# Patient Record
Sex: FEMALE | Race: WHITE | NOT HISPANIC OR LATINO | Employment: FULL TIME | URBAN - METROPOLITAN AREA
[De-identification: names, ages, dates, MRNs, and addresses within clinical notes are randomized per-mention and may not be internally consistent; named-entity substitution may affect disease eponyms.]

---

## 2017-01-25 ENCOUNTER — HOSPITAL ENCOUNTER (OUTPATIENT)
Dept: RADIOLOGY | Facility: HOSPITAL | Age: 57
Discharge: HOME | End: 2017-01-25
Payer: COMMERCIAL

## 2017-01-25 ENCOUNTER — ALLSCRIPTS OFFICE VISIT (OUTPATIENT)
Dept: OTHER | Facility: OTHER | Age: 57
End: 2017-01-25

## 2017-01-25 ENCOUNTER — TRANSCRIBE ORDERS (OUTPATIENT)
Dept: RADIOLOGY | Facility: HOSPITAL | Age: 57
End: 2017-01-25

## 2017-01-25 DIAGNOSIS — M20.61 ACQUIRED DEFORMITY OF RIGHT TOE: Primary | ICD-10-CM

## 2017-01-25 DIAGNOSIS — G57.61 LESION OF RIGHT PLANTAR NERVE: ICD-10-CM

## 2017-01-25 PROCEDURE — 73630 X-RAY EXAM OF FOOT: CPT

## 2017-03-29 ENCOUNTER — ALLSCRIPTS OFFICE VISIT (OUTPATIENT)
Dept: OTHER | Facility: OTHER | Age: 57
End: 2017-03-29

## 2017-03-29 DIAGNOSIS — Z13.820 ENCOUNTER FOR SCREENING FOR OSTEOPOROSIS: ICD-10-CM

## 2017-11-06 ENCOUNTER — TRANSCRIBE ORDERS (OUTPATIENT)
Dept: ADMINISTRATIVE | Facility: HOSPITAL | Age: 57
End: 2017-11-06

## 2017-11-06 ENCOUNTER — APPOINTMENT (OUTPATIENT)
Dept: LAB | Facility: HOSPITAL | Age: 57
End: 2017-11-06
Payer: COMMERCIAL

## 2017-11-06 DIAGNOSIS — E55.9 VITAMIN D DEFICIENCY DISEASE: ICD-10-CM

## 2017-11-06 DIAGNOSIS — M34.1 CRST SYNDROME (HCC): Primary | ICD-10-CM

## 2017-11-06 DIAGNOSIS — Z79.899 ENCOUNTER FOR LONG-TERM (CURRENT) USE OF MEDICATIONS: ICD-10-CM

## 2017-11-06 DIAGNOSIS — M34.1 CRST SYNDROME (HCC): ICD-10-CM

## 2017-11-06 LAB
25(OH)D3 SERPL-MCNC: 37.5 NG/ML (ref 30–100)
ALBUMIN SERPL BCP-MCNC: 3.8 G/DL (ref 3.5–5)
ALP SERPL-CCNC: 82 U/L (ref 46–116)
ALT SERPL W P-5'-P-CCNC: 26 U/L (ref 12–78)
ANION GAP SERPL CALCULATED.3IONS-SCNC: 8 MMOL/L (ref 4–13)
AST SERPL W P-5'-P-CCNC: 20 U/L (ref 5–45)
BASOPHILS # BLD AUTO: 0.07 THOUSANDS/ΜL (ref 0–0.1)
BASOPHILS NFR BLD AUTO: 1 % (ref 0–1)
BILIRUB SERPL-MCNC: 0.4 MG/DL (ref 0.2–1)
BUN SERPL-MCNC: 18 MG/DL (ref 5–25)
CALCIUM SERPL-MCNC: 9.1 MG/DL (ref 8.3–10.1)
CHLORIDE SERPL-SCNC: 105 MMOL/L (ref 100–108)
CO2 SERPL-SCNC: 28 MMOL/L (ref 21–32)
CREAT SERPL-MCNC: 0.8 MG/DL (ref 0.6–1.3)
CRP SERPL QL: <3 MG/L
EOSINOPHIL # BLD AUTO: 0.16 THOUSAND/ΜL (ref 0–0.61)
EOSINOPHIL NFR BLD AUTO: 3 % (ref 0–6)
ERYTHROCYTE [DISTWIDTH] IN BLOOD BY AUTOMATED COUNT: 12.2 % (ref 11.6–15.1)
ERYTHROCYTE [SEDIMENTATION RATE] IN BLOOD: 6 MM/HOUR (ref 0–20)
GFR SERPL CREATININE-BSD FRML MDRD: 83 ML/MIN/1.73SQ M
GLUCOSE P FAST SERPL-MCNC: 90 MG/DL (ref 65–99)
HCT VFR BLD AUTO: 40 % (ref 34.8–46.1)
HGB BLD-MCNC: 13 G/DL (ref 11.5–15.4)
LYMPHOCYTES # BLD AUTO: 2.19 THOUSANDS/ΜL (ref 0.6–4.47)
LYMPHOCYTES NFR BLD AUTO: 39 % (ref 14–44)
MCH RBC QN AUTO: 30.4 PG (ref 26.8–34.3)
MCHC RBC AUTO-ENTMCNC: 32.5 G/DL (ref 31.4–37.4)
MCV RBC AUTO: 94 FL (ref 82–98)
MONOCYTES # BLD AUTO: 0.51 THOUSAND/ΜL (ref 0.17–1.22)
MONOCYTES NFR BLD AUTO: 9 % (ref 4–12)
NEUTROPHILS # BLD AUTO: 2.72 THOUSANDS/ΜL (ref 1.85–7.62)
NEUTS SEG NFR BLD AUTO: 48 % (ref 43–75)
NRBC BLD AUTO-RTO: 0 /100 WBCS
PLATELET # BLD AUTO: 301 THOUSANDS/UL (ref 149–390)
PMV BLD AUTO: 10.3 FL (ref 8.9–12.7)
POTASSIUM SERPL-SCNC: 4.8 MMOL/L (ref 3.5–5.3)
PROT SERPL-MCNC: 7.3 G/DL (ref 6.4–8.2)
RBC # BLD AUTO: 4.27 MILLION/UL (ref 3.81–5.12)
SODIUM SERPL-SCNC: 141 MMOL/L (ref 136–145)
WBC # BLD AUTO: 5.66 THOUSAND/UL (ref 4.31–10.16)

## 2017-11-06 PROCEDURE — 85652 RBC SED RATE AUTOMATED: CPT

## 2017-11-06 PROCEDURE — 82306 VITAMIN D 25 HYDROXY: CPT

## 2017-11-06 PROCEDURE — 85025 COMPLETE CBC W/AUTO DIFF WBC: CPT

## 2017-11-06 PROCEDURE — 80053 COMPREHEN METABOLIC PANEL: CPT

## 2017-11-06 PROCEDURE — 86140 C-REACTIVE PROTEIN: CPT

## 2017-11-06 PROCEDURE — 36415 COLL VENOUS BLD VENIPUNCTURE: CPT

## 2017-11-14 ENCOUNTER — GENERIC CONVERSION - ENCOUNTER (OUTPATIENT)
Dept: OTHER | Facility: OTHER | Age: 57
End: 2017-11-14

## 2017-12-05 ENCOUNTER — HOSPITAL ENCOUNTER (OUTPATIENT)
Dept: MAMMOGRAPHY | Facility: CLINIC | Age: 57
Discharge: HOME | End: 2017-12-05
Payer: COMMERCIAL

## 2017-12-05 DIAGNOSIS — Z13.820 ENCOUNTER FOR SCREENING FOR OSTEOPOROSIS: ICD-10-CM

## 2017-12-05 PROCEDURE — 77080 DXA BONE DENSITY AXIAL: CPT

## 2017-12-07 ENCOUNTER — GENERIC CONVERSION - ENCOUNTER (OUTPATIENT)
Dept: OTHER | Facility: OTHER | Age: 57
End: 2017-12-07

## 2018-01-11 NOTE — RESULT NOTES
Verified Results  US THYROID [id number] 08:39AM Linda Adams Order Number: [id number]    - Patient Instructions: To schedule this appointment, please contact Central Scheduling at [phone]  Test Name Result Flag Reference   US THYROID (Report)     THYROID ULTRASOUND     INDICATION: Thyroid nodule follow-up     COMPARISON: 1/4/2016 and priors     TECHNIQUE:  Ultrasound of the thyroid was performed with a high frequency linear transducer in transverse and sagittal planes including volumetric imaging sweeps as well as traditional still imaging technique  FINDINGS:   Normal homogeneous smooth echotexture  Right gland: 5 6 x 2 x 2 7 cm  Large solid hypervascular nodule measures 3 4 x 2 1 x 2 5 cm  Prior measurement 3 2 x 2 3 x 3 1 cm  This appears similar given variation in measuring technique  Small posterior hypoechoic nodule measures 6 x 4 x 6 mm, stable in retrospect  Left gland: 5 x 1 1 x 1 6 cm  Small colloid cysts again noted  Isthmus: The isthmus is 0 2 cm in AP dimension  IMPRESSION:   Large right thyroid nodule without significant change  No new nodules         Workstation performed: [id number]     Signed by:   Felisha Barrientos MD   12/28/16

## 2018-01-11 NOTE — PROGRESS NOTES
Assessment    1  Thyroid nodule (241 0) (E04 1)    Plan  Thyroid nodule    · Follow-up visit in 1 year Evaluation and Treatment  Follow-up  Status: Hold For -  Scheduling  Requested for: 20Jan2016   Ordered; For: Thyroid nodule; Ordered By: Prince Vargas Performed:  Due: [id number]    Discussion/Summary  Discussion Summary:   48 year old female with right thyroid nodule that appears stable  Ultrasound 1/4/16 showed nodule is the same size  She has no symptoms and clinically there is no change in size  I told her we would follow this clinically for now and re check one year  Call sooner if any issues  Medication SE Review and Pt Understands Tx: The treatment plan was reviewed with the patient/guardian  The patient/guardian understands and agrees with the treatment plan      Chief Complaint  Chief Complaint Free Text Note Form: 6 month thyroid f/u  History of Present Illness  HPI: 48year old female with right thyroid nodule for some time  No new complaints or symptoms related to it  Recent ultrasound done  Review of Systems  Complete-Female:   Constitutional: No fever, no chills, feels well, no tiredness, no recent weight gain or weight loss  Eyes: No complaints of eye pain, no red eyes, no eyesight problems, no discharge, no dry eyes, no itching of eyes  ENT: no complaints of earache, no loss of hearing, no nose bleeds, no nasal discharge, no sore throat, no hoarseness  Cardiovascular: No complaints of slow heart rate, no fast heart rate, no chest pain, no palpitations, no leg claudication, no lower extremity edema  Respiratory: No complaints of shortness of breath, no wheezing, no cough, no SOB on exertion, no orthopnea, no PND  Gastrointestinal: No complaints of abdominal pain, no constipation, no nausea or vomiting, no diarrhea, no bloody stools  Genitourinary: No complaints of dysuria, no incontinence, no pelvic pain, no dysmenorrhea, no vaginal discharge or bleeding  Musculoskeletal: hand swelling  Neurological: No complaints of headache, no confusion, no convulsions, no numbness, no dizziness or fainting, no tingling, no limb weakness, no difficulty walking  Psychiatric: Not suicidal, no sleep disturbance, no anxiety or depression, no change in personality, no emotional problems  Endocrine: No complaints of proptosis, no hot flashes, no muscle weakness, no deepening of the voice, no feelings of weakness  Hematologic/Lymphatic: No complaints of swollen glands, no swollen glands in the neck, does not bleed easily, does not bruise easily  Active Problems    1  Carpal tunnel syndrome, unspecified laterality (354 0) (G56 00)   2  Raynaud's phenomenon (443 0) (I73 00)   3  Scleroderma (710 1) (M34 9)   4  Screen for colon cancer (V76 51) (Z12 11)   5  Thyroid nodule (241 0) (E04 1)    Past Medical History    1  History of Patient denies medical problems (V49 89) (Z78 9)  Active Problems And Past Medical History Reviewed: The active problems and past medical history were reviewed and updated today  Surgical History    1  History of Oral Surgery Tooth Extraction  Surgical History Reviewed: The surgical history was reviewed and updated today  Family History    1  Family history of Skin cancer    2  Family history of Kidney disease   3  Family history of Lung cancer    4  Family history of Uterine cancer    5  Family history of Breast CA  Family History Reviewed: The family history was reviewed and updated today  Social History    · Always uses seat belt   · Full-time employment   ·    · Never A Smoker   · No drug use   · Occasional alcohol use  Social History Reviewed: The social history was reviewed and updated today  Current Meds   1  FLUoxetine HCl - 20 MG Oral Capsule; TAKE 1 CAPSULE Daily Recorded   2  NIFEdipine ER Osmotic Release 30 MG Oral Tablet Extended Release 24 Hour; TAKE 1   TABLET EVERY DAY Recorded   3   Plaquenil TABS; Therapy: (Recorded:31Jan2014) to Recorded    Allergies    1  Codeine Derivatives   2  Tetracyclines    Vitals  Vital Signs [Data Includes: Current Encounter]    Recorded: 25SNW4294 10:51AM   Temperature 98 F   Heart Rate 60   Respiration 12   Systolic 184   Diastolic 58   Height 5 ft 2 in   Weight 116 lb    BMI Calculated 21 22   BSA Calculated 1 52     Physical Exam    Constitutional   General appearance: No acute distress, well appearing and well nourished  Eyes   Conjunctiva and lids: No swelling, erythema or discharge  Pupils and irises: Equal, round and reactive to light  Sclera non-icteric  Ears, Nose, Mouth, and Throat   External inspection of ears and nose: Normal     Neck Abnormal  right thyroid nodule about 2 cm, no other thyroid masses, no nodes  Pulmonary   Respiratory effort: No increased work of breathing or signs of respiratory distress  Auscultation of lungs: Clear to auscultation, equal breath sounds bilaterally, no wheezes, no rales, no rhonci  Cardiovascular   Auscultation of heart: Normal rate and rhythm, normal S1 and S2, without murmurs  Examination of extremities for edema and/or varicosities: Normal     Carotid pulses: Normal     Abdomen   Abdomen: Non-tender, no masses  Liver and spleen: No hepatomegaly or splenomegaly  Lymphatic   Palpation of lymph nodes in neck: No lymphadenopathy  Musculoskeletal   Extremities: No clubbing, no cyanosis, no edema   Neurologic   Sensation: Motor and sensory grossly intact  Psychiatric   Orientation to person, place, and time: Normal     Mood and affect: Normal        Future Appointments    Date/Time Provider Specialty Site   03/16/2016 06:00 PM ANNE Lees   [address] [facility]     Signatures   Electronically signed by : Yaquelin Salvador MD; Jan 20 2016 11:14AM EST                       (Author)

## 2018-01-12 VITALS
TEMPERATURE: 97.9 F | SYSTOLIC BLOOD PRESSURE: 108 MMHG | HEIGHT: 62 IN | DIASTOLIC BLOOD PRESSURE: 62 MMHG | HEART RATE: 64 BPM | BODY MASS INDEX: 20.68 KG/M2 | RESPIRATION RATE: 12 BRPM | WEIGHT: 112.38 LBS

## 2018-01-15 NOTE — RESULT NOTES
Verified Results  (1) TSH WITH FT4 REFLEX [id number] 08:46AM Tiffanie HAYES Order Number: [id number]  TW Order Number: [id number] Order Number: [id number] Order Number: [id number]     Test Name Result Flag Reference   TSH 1 630 uIU/mL  0 358-3 740   The recommended reference ranges for TSH during pregnancy are as follows:  First trimester 0 1 to 2 5 uIU/mL  Second trimester  0 2 to 3 0 uIU/mL  Third trimester 0 3 to 3 0 uIU/m     (1) LIPID PANEL, FASTING [id number] 08:46AM Cayetano Gonzales Order Number: [id number]  TW Order Number: [id number] Order Number: [id number] Order Number: [id number]     Test Name Result Flag Reference   CHOLESTEROL 188 mg/dL     HDL,DIRECT 75 mg/dL H 40-60   Specimen collection should occur prior to Metamizole administration due to the potential for falsely depressed results  LDL CHOLESTEROL CALCULATED 105 mg/dL H 0-100   Triglyceride:         Normal              <150 mg/dl       Borderline High    150-199 mg/dl       High               200-499 mg/dl       Very High          >499 mg/dl  Cholesterol:         Desirable        <200 mg/dl      Borderline High  200-239 mg/dl      High             >239 mg/dl  HDL Cholesterol:        High    >59 mg/dL      Low     <41 mg/dL  LDL CALCULATED:    This screening LDL is a calculated result  It does not have the accuracy of the Direct Measured LDL in the monitoring of patients with hyperlipidemia and/or statin therapy  Direct Measure LDL (WSG418) must be ordered separately in these patients  TRIGLYCERIDES 41 mg/dL  <=150   Specimen collection should occur prior to N-Acetylcysteine or Metamizole administration due to the potential for falsely depressed results       (1) COMPREHENSIVE METABOLIC PANEL [id number] 55:99BL Tiffanie Aranda   TW Order Number: [id number]  TW Order Number: [id number] Order Number: [id number] Order Number: [id number]     Test Name Result Flag Reference   GLUCOSE,RANDM 81 mg/dL     If the patient is fasting, the ADA then defines impaired fasting glucose as > 100 mg/dL and diabetes as > or equal to 123 mg/dL  SODIUM 139 mmol/L  136-145   POTASSIUM 4 4 mmol/L  3 5-5 3   CHLORIDE 103 mmol/L  100-108   CARBON DIOXIDE 30 mmol/L  21-32   ANION GAP (CALC) 6 mmol/L  4-13   BLOOD UREA NITROGEN 12 mg/dL  5-25   CREATININE 0 68 mg/dL  0 60-1 30   Standardized to IDMS reference method   CALCIUM 8 7 mg/dL  8 3-10 1   BILI, TOTAL 0 21 mg/dL  0 20-1 00   ALK PHOSPHATAS 65 U/L     ALT (SGPT) 27 U/L  12-78   AST(SGOT) 22 U/L  5-45   ALBUMIN 3 7 g/dL  3 5-5 0   TOTAL PROTEIN 6 9 g/dL  6 4-8 2   eGFR Non-African American      >60 0 ml/min/1 73sq m   [facility] Disease Education Program recommendations are as follows:  GFR calculation is accurate only with a steady state creatinine  Chronic Kidney disease less than 60 ml/min/1 73 sq  meters  Kidney failure less than 15 ml/min/1 73 sq  meters  (1) CBC/PLT/DIFF [id number] 08:46AM [facility] Order Number: [id number]  TW Order Number: [id number]     Test Name Result Flag Reference   WBC COUNT 5 22 Thousand/uL  4 31-10 16   RBC COUNT 4 54 Million/uL  3 81-5 12   HEMOGLOBIN 14 3 g/dL  11 5-15 4   HEMATOCRIT 42 7 %  34 8-46  1   MCV 94 fL  82-98   MCH 31 5 pg  26 8-34 3   MCHC 33 5 g/dL  31 4-37 4   RDW 12 8 %  11 6-15 1   MPV 11 0 fL  8 9-12 7   PLATELET COUNT 956 Thousands/uL  149-390   nRBC AUTOMATED 0 /100 WBCs     NEUTROPHILS RELATIVE PERCENT 58 %  43-75   LYMPHOCYTES RELATIVE PERCENT 29 %  14-44   MONOCYTES RELATIVE PERCENT 9 %  4-12   EOSINOPHILS RELATIVE PERCENT 3 %  0-6   BASOPHILS RELATIVE PERCENT 1 %  0-1   NEUTROPHILS ABSOLUTE COUNT 3 03 Thousands/?L  1 85-7 62   LYMPHOCYTES ABSOLUTE COUNT 1 53 Thousands/?L  0 60-4 47   MONOCYTES ABSOLUTE COUNT 0 47 Thousand/?L  0 17-1 22   EOSINOPHILS ABSOLUTE COUNT 0 14 Thousand/?L  0 00-0 61   BASOPHILS ABSOLUTE COUNT 0 04 Thousands/?L  0 00-0 10

## 2018-01-16 NOTE — PROGRESS NOTES
Assessment    1  Osteoporosis screening (V82 81) (Z13 820)   2  Encounter for preventive health examination (V70 0) (Z00 00)   3  Raynaud's syndrome without gangrene (443 0) (I73 00)    Plan  Osteoporosis screening    · * DXA BONE DENSITY SPINE HIP AND PELVIS; Status:Hold For - Scheduling;  Requested for:29Mar2017;     Discussion/Summary  health maintenance visit Currently, she eats a healthy diet and has an adequate exercise regimen  the risks and benefits of cervical cancer screening were discussed cervical cancer screening is current cervical cancer screening is needed every year cervical cancer screening is managed by [facility] Breast cancer screening: the risks and benefits of breast cancer screening were discussed, mammogram is current and breast cancer screening is managed by [facility]  Colorectal cancer screening: colorectal cancer screening is current and colorectal cancer screening is managed by [facility]  Osteoporosis screening: the risks and benefits of osteoporosis screening were discussed and bone mineral density testing has been ordered  History of Present Illness  HM, Adult Female: The patient is being seen for a health maintenance evaluation  Social History: Household members include spouse and adult children  She is   Work status: working full time and occupation: Administrative  The patient has never smoked cigarettes  She reports rare alcohol use  She has never used illicit drugs  General Health: The patient's health since the last visit is described as good  She has regular dental visits  She denies vision problems  She denies hearing loss  Immunizations status: up to date  Lifestyle:  She consumes a diverse and healthy diet  She does not have any weight concerns  She exercises regularly  She exercises 3 or more times per week  Exercise includes running/jogging  She does not use tobacco  She denies alcohol use  She denies drug use     Reproductive health:  she reports normal menses  Screening: cancer screening reviewed and current  metabolic screening reviewed and current  risk screening reviewed and current  Review of Systems    Constitutional: No fever, no chills, feels well, no tiredness, no recent weight gain or weight loss  Eyes: No complaints of eye pain, no red eyes, no eyesight problems, no discharge, no dry eyes, no itching of eyes  ENT: no complaints of earache, no loss of hearing, no nose bleeds, no nasal discharge, no sore throat, no hoarseness  Cardiovascular: No complaints of slow heart rate, no fast heart rate, no chest pain, no palpitations, no leg claudication, no lower extremity edema  Respiratory: No complaints of shortness of breath, no wheezing, no cough, no SOB on exertion, no orthopnea, no PND  Gastrointestinal: No complaints of abdominal pain, no constipation, no nausea or vomiting, no diarrhea, no bloody stools  Genitourinary: No complaints of dysuria, no incontinence, no pelvic pain, no dysmenorrhea, no vaginal discharge or bleeding  Musculoskeletal: No complaints of arthralgias, no myalgias, no joint swelling or stiffness, no limb pain or swelling  Integumentary: No complaints of skin rash or lesions, no itching, no skin wounds, no breast pain or lump  Neurological: No complaints of headache, no confusion, no convulsions, no numbness, no dizziness or fainting, no tingling, no limb weakness, no difficulty walking  Active Problems    1  Breast cancer screening (V76 10) (Z12 39)   2  Carpal tunnel syndrome, unspecified laterality (354 0) (G56 00)   3  Raynaud's syndrome without gangrene (443 0) (I73 00)   4  Scleroderma (710 1) (M34 9)   5  Screen for colon cancer (V76 51) (Z12 11)   6   Thyroid nodule (241 0) (E04 1)    Past Medical History    · History of Patient denies medical problems (V49 89) (Z78 9)    Surgical History    · History of Oral Surgery Tooth Extraction    The surgical history was reviewed and updated today  Family History  Mother    · Family history of Skin cancer  Father    · Family history of Kidney disease   · Family history of Lung cancer  Maternal Grandmother    · Family history of Uterine cancer  Aunt    · Family history of Breast CA    The family history was reviewed and updated today  Social History    · Always uses seat belt   · Full-time employment   ·    · Never A Smoker   · No drug use   · Occasional alcohol use  The social history was reviewed and updated today  The social history was reviewed and is unchanged  Current Meds   1  FLUoxetine HCl - 20 MG Oral Capsule; TAKE 1 CAPSULE Daily Recorded   2  NIFEdipine ER Osmotic Release 30 MG Oral Tablet Extended Release 24 Hour; TAKE 1   TABLET EVERY DAY Recorded   3  Plaquenil TABS; Therapy: (Recorded:31Jan2014) to Recorded    The medication list was reviewed and updated today  Allergies    1  Codeine Derivatives   2  Tetracyclines    Vitals   Recorded: 37QDE7928 06:19PM   Heart Rate 64   Respiration 16   Systolic 464   Diastolic 70     Physical Exam    Constitutional   General appearance: No acute distress, well appearing and well nourished  Eyes   Conjunctiva and lids: No swelling, erythema or discharge  Pupils and irises: Equal, round and reactive to light  Ears, Nose, Mouth, and Throat   External inspection of ears and nose: Normal     Otoscopic examination: Tympanic membranes translucent with normal light reflex  Canals patent without erythema  Oropharynx: Normal with no erythema, edema, exudate or lesions  Pulmonary   Respiratory effort: No increased work of breathing or signs of respiratory distress  Auscultation of lungs: Clear to auscultation  Cardiovascular   Palpation of heart: Normal PMI, no thrills  Auscultation of heart: Normal rate and rhythm, normal S1 and S2, without murmurs      Examination of extremities for edema and/or varicosities: Normal     Abdomen   Abdomen: Non-tender, no masses  Liver and spleen: No hepatomegaly or splenomegaly  Lymphatic   Palpation of lymph nodes in neck: No lymphadenopathy  Musculoskeletal   Gait and station: Normal     Digits and nails: Normal without clubbing or cyanosis  Inspection/palpation of joints, bones, and muscles: Normal     Skin   Skin and subcutaneous tissue: Normal without rashes or lesions  Neurologic   Cranial nerves: Cranial nerves 2-12 intact  Reflexes: 2+ and symmetric  Sensation: No sensory loss      Psychiatric   Orientation to person, place, and time: Normal     Mood and affect: Normal        Future Appointments    Date/Time Provider Specialty Site   01/24/2018 09:00 AM Haley Escobar MD General Surgery [facility]     Signatures   Electronically signed by : ANNE Lopez ; Mar 29 2017  6:57PM EST                       (Author)

## 2018-01-22 VITALS — SYSTOLIC BLOOD PRESSURE: 138 MMHG | DIASTOLIC BLOOD PRESSURE: 70 MMHG | RESPIRATION RATE: 16 BRPM | HEART RATE: 64 BPM

## 2018-01-23 NOTE — RESULT NOTES
Verified Results  * DXA BONE DENSITY SPINE HIP AND PELVIS [id number] 08:06AM Ashely Seen Order Number: [id number]    - Patient Instructions: To schedule this appointment, please contact Central Scheduling at [phone]  Test Name Result Flag Reference   DXA BONE DENSITY SPINE HIP AND PELVIS (Report)     CENTRAL DXA SCAN     CLINICAL HISTORY:  65year old post-menopausal  female with no significant past medical history  Osteoporosis screening  TECHNIQUE: Bone densitometry was performed using a Horizon C bone densitometer  Regions of interest appear properly placed  There are no obvious fractures or other confounding variables which could limit the study  Degenerative changes of the lumbar    spine and hip  This will falsely elevate the bone mineral densities in these regions  COMPARISON: None  RESULTS:    LUMBAR SPINE: L1-L4:   BMD 0 832 gm/cm2   T-score -2 0   Z-score -0 8     LEFT TOTAL HIP:   BMD 1 001 gm/cm2   T-score 0 5   Z-score 1 2     LEFT FEMORAL NECK:   BMD 0 705 gm/cm2   T-score -1 3   Z-score -0 2             IMPRESSION:   1  Based on the [facility] classification, the T-score of -2 0 in the lumbar spine is consistent with low bone mineral density  2  The 10 year risk of hip fracture is 0 4 % with the 10 year risk of major osteoporotic fracture being 6 % as calculated by the WHO fracture risk assessment tool (FRAX)  The current NOF guidelines recommend treating patients with FRAX 10 year risk    score of >3% for hip fracture and >20% for major osteoporotic fracture  3  Any secondary causes of low bone mineral density should be excluded prior to treatment, if clinically indicated  4  A daily intake of at least 1200 mg calcium and 800 - 1000 IU of Vitamin D, as well as weight bearing and muscle strengthening exercise, fall prevention and avoidance of tobacco and excessive alcohol intake as basic preventive measures are suggested              WHO CLASSIFICATION:   Normal (a T-score of -1 0 or higher)   Low bone mineral density (a T-score of less than -1 0 but higher than -2 5)   Osteoporosis (a T-score of -2 5 or less)   Severe osteoporosis (a T-score of -2 5 or less with a fragility fracture)             Workstation performed: [id number]     Signed by:   Eloy Serrano DO   12/5/17

## 2018-01-24 ENCOUNTER — OFFICE VISIT (OUTPATIENT)
Dept: SURGERY | Facility: CLINIC | Age: 58
End: 2018-01-24
Payer: COMMERCIAL

## 2018-01-24 VITALS
TEMPERATURE: 97.9 F | SYSTOLIC BLOOD PRESSURE: 108 MMHG | DIASTOLIC BLOOD PRESSURE: 68 MMHG | WEIGHT: 112.2 LBS | HEIGHT: 62 IN | BODY MASS INDEX: 20.65 KG/M2 | RESPIRATION RATE: 12 BRPM | HEART RATE: 68 BPM

## 2018-01-24 DIAGNOSIS — E04.1 THYROID NODULE: Primary | ICD-10-CM

## 2018-01-24 PROCEDURE — 99212 OFFICE O/P EST SF 10 MIN: CPT | Performed by: SURGERY

## 2018-01-24 RX ORDER — NIFEDIPINE 30 MG/1
1 TABLET, EXTENDED RELEASE ORAL DAILY
COMMUNITY
End: 2020-09-11 | Stop reason: SDUPTHER

## 2018-01-24 RX ORDER — FLUOXETINE HYDROCHLORIDE 20 MG/1
1 CAPSULE ORAL DAILY
COMMUNITY
End: 2020-09-11 | Stop reason: SDUPTHER

## 2018-01-24 RX ORDER — HYDROXYCHLOROQUINE SULFATE 200 MG/1
200 TABLET, FILM COATED ORAL DAILY
COMMUNITY
End: 2020-09-11 | Stop reason: SDUPTHER

## 2018-01-24 NOTE — PROGRESS NOTES
Assessment/Plan:    Thyroid nodule  A patient thyroid nodule is stable  She will follow up with her family physician  Call us if needed  Subjective:      Patient ID: Anju An is a 65 y o  female  Patient here today for yearly thyroid check  50-year-old female here for for thyroid follow-up  No new complaints problems regarding her thyroid  The following portions of the patient's history were reviewed and updated as appropriate: allergies, current medications, past family history, past medical history, past social history, past surgical history and problem list     Review of Systems   Constitutional: Negative for chills and fever  HENT: Negative for trouble swallowing and voice change  Eyes: Negative for visual disturbance  Respiratory: Negative for cough and shortness of breath  Cardiovascular: Negative for chest pain and leg swelling  Gastrointestinal: Negative for abdominal pain, nausea and vomiting  Endocrine: Negative for cold intolerance, heat intolerance, polydipsia, polyphagia and polyuria  Genitourinary: Negative for difficulty urinating  Musculoskeletal: Negative for arthralgias and gait problem  Skin: Negative for wound  Allergic/Immunologic: Negative for food allergies  Neurological: Negative for seizures, syncope, weakness and headaches  Hematological: Negative for adenopathy  Psychiatric/Behavioral: Negative for confusion  All other systems reviewed and are negative  Objective:     Physical Exam   Constitutional: She is oriented to person, place, and time  She appears well-developed and well-nourished  HENT:   Head: Normocephalic and atraumatic  Right Ear: External ear normal    Left Ear: External ear normal    Eyes: Conjunctivae are normal    Neck: Neck supple  Thyroid nodule about 2 cm  Smooth rounded not hard   Cardiovascular: Normal rate, regular rhythm and normal heart sounds      Pulmonary/Chest: Effort normal and breath sounds normal    Abdominal: Soft  Bowel sounds are normal  There is no tenderness  There is no rebound and no guarding  Musculoskeletal: Normal range of motion  Lymphadenopathy:     She has no cervical adenopathy  Neurological: She is alert and oriented to person, place, and time  Skin: Skin is warm and dry  Psychiatric: She has a normal mood and affect   Her behavior is normal  Judgment and thought content normal

## 2018-01-24 NOTE — ASSESSMENT & PLAN NOTE
A patient thyroid nodule is stable  She will follow up with her family physician  Call us if needed

## 2018-04-11 ENCOUNTER — OFFICE VISIT (OUTPATIENT)
Dept: FAMILY MEDICINE CLINIC | Facility: MEDICAL CENTER | Age: 58
End: 2018-04-11
Payer: COMMERCIAL

## 2018-04-11 VITALS
DIASTOLIC BLOOD PRESSURE: 60 MMHG | BODY MASS INDEX: 20.67 KG/M2 | WEIGHT: 113 LBS | HEART RATE: 60 BPM | RESPIRATION RATE: 16 BRPM | SYSTOLIC BLOOD PRESSURE: 120 MMHG

## 2018-04-11 DIAGNOSIS — G56.00 CARPAL TUNNEL SYNDROME, UNSPECIFIED LATERALITY: ICD-10-CM

## 2018-04-11 DIAGNOSIS — Z13.220 SCREENING FOR LIPID DISORDERS: ICD-10-CM

## 2018-04-11 DIAGNOSIS — E04.1 THYROID NODULE: Primary | ICD-10-CM

## 2018-04-11 DIAGNOSIS — M34.9 SCLERODERMA (HCC): ICD-10-CM

## 2018-04-11 DIAGNOSIS — Z12.11 SCREENING FOR COLORECTAL CANCER: ICD-10-CM

## 2018-04-11 DIAGNOSIS — Z12.12 SCREENING FOR COLORECTAL CANCER: ICD-10-CM

## 2018-04-11 DIAGNOSIS — M85.88 OSTEOPENIA OF SPINE: ICD-10-CM

## 2018-04-11 DIAGNOSIS — I73.00 RAYNAUD'S SYNDROME WITHOUT GANGRENE: ICD-10-CM

## 2018-04-11 PROBLEM — M85.80 OSTEOPENIA: Status: ACTIVE | Noted: 2017-06-20

## 2018-04-11 PROCEDURE — 99213 OFFICE O/P EST LOW 20 MIN: CPT | Performed by: FAMILY MEDICINE

## 2018-04-11 PROCEDURE — 99396 PREV VISIT EST AGE 40-64: CPT | Performed by: FAMILY MEDICINE

## 2018-04-11 PROCEDURE — 1036F TOBACCO NON-USER: CPT | Performed by: FAMILY MEDICINE

## 2018-04-11 NOTE — ASSESSMENT & PLAN NOTE
Right-sided thyroid nodule which is stable  He she has been released from surveillance  Call if she feels that it is getting larger causing any problems

## 2018-04-11 NOTE — ASSESSMENT & PLAN NOTE
Patient has Raynaud syndrome  She does take Plaquenil and Procardia  She also takes Prozac  She sees Rheumatology  She gets minimal of but significant relief  Still has significant Raynaud's phenomenon when her hands get wet cold  Continue follow-up with Rheumatology

## 2018-04-11 NOTE — PROGRESS NOTES
Assessment/Plan:    No problem-specific Assessment & Plan notes found for this encounter  There are no diagnoses linked to this encounter  Subjective:      Patient ID: Duarte Garcia is a 65 y o  female  Patient is a 60-year-old woman  She lives at home with her   She has two adult children who are more last out of the house  She is an  for a law firm  She is athletic she likes to run for fitness  She does not smoke or abuse alcohol  She is up-to-date with her mammography and cervical cancer screening  She has had a colonoscopy within the last few years  She would like to do  Hemoccult  The following portions of the patient's history were reviewed and updated as appropriate: allergies, current medications, past family history, past medical history, past social history, past surgical history and problem list     Review of Systems   Constitutional: Negative for activity change, appetite change, fatigue and fever  HENT: Negative for congestion, ear pain, hearing loss, nosebleeds, postnasal drip, rhinorrhea and trouble swallowing  Eyes: Negative for photophobia, pain, redness and visual disturbance  Respiratory: Negative for cough, chest tightness, shortness of breath and wheezing  Cardiovascular: Negative for chest pain and palpitations  Gastrointestinal: Negative for abdominal pain, blood in stool, constipation, diarrhea, nausea and vomiting  Endocrine: Negative for cold intolerance, heat intolerance, polydipsia, polyphagia and polyuria  Genitourinary: Negative for difficulty urinating, dyspareunia, dysuria, flank pain, frequency, menstrual problem, pelvic pain, urgency, vaginal bleeding and vaginal discharge  Musculoskeletal: Negative for arthralgias, gait problem, joint swelling and myalgias  Skin: Negative for rash and wound  Neurological: Positive for dizziness (  She had an episode of vertigo and nausea last week    It has resolved ) and numbness ( patient has a burning numbness in her   Right middle toe )  Negative for tremors, seizures, syncope, speech difficulty, weakness, light-headedness and headaches  Psychiatric/Behavioral: Negative for agitation, decreased concentration, dysphoric mood, sleep disturbance and suicidal ideas  The patient is not nervous/anxious  Objective:      /60 (Cuff Size: Standard)   Pulse 60   Resp 16   Wt 51 3 kg (113 lb)   BMI 20 67 kg/m²          Physical Exam   Constitutional: She is oriented to person, place, and time  Vital signs are normal  She appears well-developed and well-nourished  She is cooperative  HENT:   Head: Normocephalic  Right Ear: Hearing, external ear and ear canal normal    Left Ear: Hearing, tympanic membrane, external ear and ear canal normal    Nose: Nose normal  No mucosal edema or rhinorrhea  Mouth/Throat: Uvula is midline, oropharynx is clear and moist and mucous membranes are normal  No oropharyngeal exudate  Eyes: Conjunctivae, EOM and lids are normal  Pupils are equal, round, and reactive to light  Neck: Carotid bruit is not present  No thyroid mass and no thyromegaly present  Cardiovascular: Normal rate, regular rhythm, S1 normal, S2 normal, normal heart sounds and normal pulses  Exam reveals no gallop  No murmur heard  Pulmonary/Chest: Effort normal and breath sounds normal  She has no wheezes  She has no rales  Abdominal: Soft  Normal appearance, normal aorta and bowel sounds are normal  There is no hepatosplenomegaly  There is no tenderness  There is no CVA tenderness  No hernia  Musculoskeletal: Normal range of motion  Lymphadenopathy:     She has no cervical adenopathy  Neurological: She is oriented to person, place, and time  She has normal strength  No cranial nerve deficit or sensory deficit  Coordination normal    Skin: Skin is warm, dry and intact  No rash noted  Psychiatric: She has a normal mood and affect   Her speech is normal and behavior is normal  Judgment and thought content normal  Cognition and memory are normal    Nursing note and vitals reviewed

## 2018-04-11 NOTE — ASSESSMENT & PLAN NOTE
Patient still has some carpal tunnel syndrome  Some numbness in the fingers  Patient has been offered referral to Orthopedics  She is not ready for to do that  Also she can try wrist splints particularly at night

## 2018-04-11 NOTE — ASSESSMENT & PLAN NOTE
Patient has a T-score of minus two in the lumbar spine, minus 1 2 in the femur  She is in the positive range for her hip  Continue vitamin D and calcium  Continue weight-bearing exercise

## 2018-10-16 ENCOUNTER — APPOINTMENT (OUTPATIENT)
Dept: LAB | Facility: HOSPITAL | Age: 58
End: 2018-10-16
Payer: COMMERCIAL

## 2018-10-16 DIAGNOSIS — E04.1 THYROID NODULE: ICD-10-CM

## 2018-10-16 DIAGNOSIS — Z13.220 SCREENING FOR LIPID DISORDERS: ICD-10-CM

## 2018-10-16 LAB
CHOLEST SERPL-MCNC: 188 MG/DL (ref 50–200)
HDLC SERPL-MCNC: 74 MG/DL (ref 40–60)
LDLC SERPL CALC-MCNC: 105 MG/DL (ref 0–100)
NONHDLC SERPL-MCNC: 114 MG/DL
TRIGL SERPL-MCNC: 47 MG/DL
TSH SERPL DL<=0.05 MIU/L-ACNC: 3.35 UIU/ML (ref 0.36–3.74)

## 2018-10-16 PROCEDURE — 84443 ASSAY THYROID STIM HORMONE: CPT

## 2018-10-16 PROCEDURE — 36415 COLL VENOUS BLD VENIPUNCTURE: CPT

## 2018-10-16 PROCEDURE — 80061 LIPID PANEL: CPT

## 2018-11-12 ENCOUNTER — APPOINTMENT (OUTPATIENT)
Dept: LAB | Facility: HOSPITAL | Age: 58
End: 2018-11-12
Payer: COMMERCIAL

## 2018-11-12 DIAGNOSIS — Z12.11 SCREENING FOR COLORECTAL CANCER: ICD-10-CM

## 2018-11-12 DIAGNOSIS — Z12.12 SCREENING FOR COLORECTAL CANCER: ICD-10-CM

## 2018-11-12 LAB — HEMOCCULT STL QL IA: NEGATIVE

## 2018-11-12 PROCEDURE — G0328 FECAL BLOOD SCRN IMMUNOASSAY: HCPCS

## 2018-12-20 ENCOUNTER — TELEPHONE (OUTPATIENT)
Dept: FAMILY MEDICINE CLINIC | Facility: MEDICAL CENTER | Age: 58
End: 2018-12-20

## 2018-12-20 NOTE — TELEPHONE ENCOUNTER
Pt went to [facility] after an [facility]  They did an xray and told her she had a thyroid nodule and to let you know  Pt thought you were already aware of it, but was just following instructions  I called [facility] ([phone]) to have them fax over the xray report

## 2018-12-21 NOTE — TELEPHONE ENCOUNTER
Yes I am aware of that  She has been following up with Dr Kostas Franco A check  She is overdue for a check with him  He wanted to see her in a year and it has been 18 months  She should call that office and get an appointment  The nodule was stable and I am not particularly concerned about it  I would however like to see her continue with that follow-up

## 2019-01-22 ENCOUNTER — TELEPHONE (OUTPATIENT)
Dept: SURGERY | Facility: CLINIC | Age: 59
End: 2019-01-22

## 2019-01-22 NOTE — TELEPHONE ENCOUNTER
Patient called asking for Samira Kee however, she was not available  Patient wanted to know if X-Rays from [facility] were received also wanted to know if Dr Destin Naylor would see her after the Xrays were received from [facility] for a mass of her neck  Will follow up with Samira Kee and also call her PCP Dr Des Subramanian office to see if they have Shalonda Lazcano 3  Seen a previous call patient made to Des Subramanian office in regards to getting Xrays

## 2019-01-25 ENCOUNTER — TELEPHONE (OUTPATIENT)
Dept: SURGERY | Facility: CLINIC | Age: 59
End: 2019-01-25

## 2019-01-25 NOTE — TELEPHONE ENCOUNTER
Pt called and said that an ultrasound done from [facility], last month, performed at [facility], showed a thyroid nodule  Pt has been seen by Dr Kerry Olmstead for thyroid nodule, which was stable in nature, and was told to follow up with us, prn   I requested a copy of the xray from [facility], and will show it to Dr Kerry Olmstead, to see if an appt  Is necessary, and will call pt back  Spoke with [facility] and they will fax over report today      mb

## 2019-01-25 NOTE — TELEPHONE ENCOUNTER
I received xray from [facility]   Put it with Dr Jonatan Briones "action" folder and will discuss with him on Tuesday  LM for pt letting her know status      mb

## 2019-01-30 ENCOUNTER — TELEPHONE (OUTPATIENT)
Dept: SURGERY | Facility: CLINIC | Age: 59
End: 2019-01-30

## 2019-01-30 NOTE — TELEPHONE ENCOUNTER
Called pt to let her know, Dr Ricco Francis review xray of thyroid nodule, and Dr Michela Charles note, that the size had not changed since last year  Dr Ricco Francis is not concerned, at this point, and said pt can continue to follow with Dr Annette Chaudhary  Advised pt and let her know, if she ever wants to refer back to Dr Ricco Francis, for this nodule, to give us a call and we will be happy to make an appt       mb

## 2019-04-17 ENCOUNTER — OFFICE VISIT (OUTPATIENT)
Dept: FAMILY MEDICINE CLINIC | Facility: MEDICAL CENTER | Age: 59
End: 2019-04-17
Payer: COMMERCIAL

## 2019-04-17 VITALS
HEIGHT: 62 IN | HEART RATE: 78 BPM | SYSTOLIC BLOOD PRESSURE: 110 MMHG | WEIGHT: 113 LBS | DIASTOLIC BLOOD PRESSURE: 70 MMHG | BODY MASS INDEX: 20.8 KG/M2

## 2019-04-17 DIAGNOSIS — E04.1 THYROID NODULE: Primary | ICD-10-CM

## 2019-04-17 DIAGNOSIS — M85.88 OSTEOPENIA OF SPINE: ICD-10-CM

## 2019-04-17 DIAGNOSIS — I73.00 RAYNAUD'S SYNDROME WITHOUT GANGRENE: ICD-10-CM

## 2019-04-17 PROCEDURE — 3008F BODY MASS INDEX DOCD: CPT | Performed by: FAMILY MEDICINE

## 2019-04-17 PROCEDURE — 99214 OFFICE O/P EST MOD 30 MIN: CPT | Performed by: FAMILY MEDICINE

## 2019-04-17 PROCEDURE — 1036F TOBACCO NON-USER: CPT | Performed by: FAMILY MEDICINE

## 2019-04-17 PROCEDURE — 99396 PREV VISIT EST AGE 40-64: CPT | Performed by: FAMILY MEDICINE

## 2019-05-28 NOTE — RESULT NOTES
Orders entered. Please enter pharmacy   Verified Results  U/S Head and Neck ( Soft Tissue) [id number] 08:04AM Sayra Kingsleyus     Test Name Result Flag Reference   U/S Head and Neck (Report)     [facility];[address];;[place];[id number]   01/04/2016 0730   01/04/2016 0800       THYROID ULTRASOUND     INDICATION- Follow-up thyroid nodule  COMPARISON- 7/21/2015  TECHNIQUE-  Ultrasound of the thyroid was performed with a high   frequency linear transducer in transverse and sagittal planes including   volumetric imaging sweeps as well as traditional still imaging   technique  FINDINGS-   Normal homogeneous smooth echotexture  Right gland- 5 5 x 2 4 x 3 2 cm  Solid vascular dominant nodule encompassing the majority of the mid to   lower pole measures 3 2 x 2 3 x 2 8 cm (presumed 3 1 x 2 2 x 2 7 cm  Left gland- 5 5 x 1 4 x 1 8 cm  No dominant nodules  Tiny subcentimeter nodules/colloid cysts are again seen  Isthmus- 0 3 cm in AP dimension  No dominant nodules  IMPRESSION-     Right thyroid nodule, not significantly changed in size allowing for   differences in measuring technique               Transcribed on- SEUN Beasley MD   Reading Radiologist- SEUN Parekh MD   Electronically Signed- SEUN Parekh MD   Released Date Time- 01/04/16 1014   ------------------------------------------------------------------------------   [id number]^ACOSTA Lees MD   [id number]^ACOSTA Lees MD

## 2019-11-04 ENCOUNTER — APPOINTMENT (OUTPATIENT)
Dept: LAB | Facility: HOSPITAL | Age: 59
End: 2019-11-04
Payer: COMMERCIAL

## 2019-11-04 ENCOUNTER — TRANSCRIBE ORDERS (OUTPATIENT)
Dept: ADMINISTRATIVE | Facility: HOSPITAL | Age: 59
End: 2019-11-04

## 2019-11-04 DIAGNOSIS — E55.9 AVITAMINOSIS D: ICD-10-CM

## 2019-11-04 DIAGNOSIS — E55.9 AVITAMINOSIS D: Primary | ICD-10-CM

## 2019-11-04 LAB
25(OH)D3 SERPL-MCNC: 44.7 NG/ML (ref 30–100)
ALBUMIN SERPL BCP-MCNC: 4 G/DL (ref 3.5–5)
ALP SERPL-CCNC: 67 U/L (ref 46–116)
ALT SERPL W P-5'-P-CCNC: 24 U/L (ref 12–78)
ANION GAP SERPL CALCULATED.3IONS-SCNC: 9 MMOL/L (ref 4–13)
AST SERPL W P-5'-P-CCNC: 22 U/L (ref 5–45)
BASOPHILS # BLD MANUAL: 0.04 THOUSAND/UL (ref 0–0.1)
BASOPHILS NFR MAR MANUAL: 1 % (ref 0–1)
BILIRUB SERPL-MCNC: 0.3 MG/DL (ref 0.2–1)
BUN SERPL-MCNC: 24 MG/DL (ref 5–25)
CALCIUM SERPL-MCNC: 9 MG/DL (ref 8.3–10.1)
CHLORIDE SERPL-SCNC: 104 MMOL/L (ref 100–108)
CO2 SERPL-SCNC: 29 MMOL/L (ref 21–32)
CREAT SERPL-MCNC: 0.81 MG/DL (ref 0.6–1.3)
CRP SERPL QL: <3 MG/L
EOSINOPHIL # BLD MANUAL: 0.04 THOUSAND/UL (ref 0–0.4)
EOSINOPHIL NFR BLD MANUAL: 1 % (ref 0–6)
ERYTHROCYTE [DISTWIDTH] IN BLOOD BY AUTOMATED COUNT: 12.2 % (ref 11.6–15.1)
ERYTHROCYTE [SEDIMENTATION RATE] IN BLOOD: 5 MM/HOUR (ref 0–20)
GFR SERPL CREATININE-BSD FRML MDRD: 80 ML/MIN/1.73SQ M
GLUCOSE P FAST SERPL-MCNC: 85 MG/DL (ref 65–99)
HCT VFR BLD AUTO: 43.4 % (ref 34.8–46.1)
HGB BLD-MCNC: 14.1 G/DL (ref 11.5–15.4)
LYMPHOCYTES # BLD AUTO: 2.07 THOUSAND/UL (ref 0.6–4.47)
LYMPHOCYTES # BLD AUTO: 49 % (ref 14–44)
MCH RBC QN AUTO: 30.8 PG (ref 26.8–34.3)
MCHC RBC AUTO-ENTMCNC: 32.5 G/DL (ref 31.4–37.4)
MCV RBC AUTO: 95 FL (ref 82–98)
MONOCYTES # BLD AUTO: 0.17 THOUSAND/UL (ref 0–1.22)
MONOCYTES NFR BLD: 4 % (ref 4–12)
NEUTROPHILS # BLD MANUAL: 1.81 THOUSAND/UL (ref 1.85–7.62)
NEUTS BAND NFR BLD MANUAL: 1 % (ref 0–8)
NEUTS SEG NFR BLD AUTO: 42 % (ref 43–75)
NRBC BLD AUTO-RTO: 0 /100 WBCS
PLATELET # BLD AUTO: 275 THOUSANDS/UL (ref 149–390)
PLATELET BLD QL SMEAR: ADEQUATE
PMV BLD AUTO: 10.2 FL (ref 8.9–12.7)
POTASSIUM SERPL-SCNC: 4 MMOL/L (ref 3.5–5.3)
PROT SERPL-MCNC: 7.6 G/DL (ref 6.4–8.2)
RBC # BLD AUTO: 4.58 MILLION/UL (ref 3.81–5.12)
SODIUM SERPL-SCNC: 142 MMOL/L (ref 136–145)
TOTAL CELLS COUNTED SPEC: 100
VARIANT LYMPHS # BLD AUTO: 2 %
WBC # BLD AUTO: 4.22 THOUSAND/UL (ref 4.31–10.16)

## 2019-11-04 PROCEDURE — 86140 C-REACTIVE PROTEIN: CPT

## 2019-11-04 PROCEDURE — 85652 RBC SED RATE AUTOMATED: CPT

## 2019-11-04 PROCEDURE — 80053 COMPREHEN METABOLIC PANEL: CPT

## 2019-11-04 PROCEDURE — 85007 BL SMEAR W/DIFF WBC COUNT: CPT

## 2019-11-04 PROCEDURE — 85027 COMPLETE CBC AUTOMATED: CPT

## 2019-11-04 PROCEDURE — 36415 COLL VENOUS BLD VENIPUNCTURE: CPT

## 2019-11-04 PROCEDURE — 82306 VITAMIN D 25 HYDROXY: CPT

## 2020-05-13 ENCOUNTER — OFFICE VISIT (OUTPATIENT)
Dept: FAMILY MEDICINE CLINIC | Facility: MEDICAL CENTER | Age: 60
End: 2020-05-13
Payer: COMMERCIAL

## 2020-05-13 VITALS
OXYGEN SATURATION: 98 % | SYSTOLIC BLOOD PRESSURE: 110 MMHG | TEMPERATURE: 98.8 F | BODY MASS INDEX: 20.06 KG/M2 | HEIGHT: 62 IN | WEIGHT: 109 LBS | HEART RATE: 87 BPM | DIASTOLIC BLOOD PRESSURE: 70 MMHG

## 2020-05-13 DIAGNOSIS — Z13.29 SCREENING FOR THYROID DISORDER: ICD-10-CM

## 2020-05-13 DIAGNOSIS — M34.9 SCLERODERMA (HCC): ICD-10-CM

## 2020-05-13 DIAGNOSIS — I73.00 RAYNAUD'S SYNDROME WITHOUT GANGRENE: Primary | ICD-10-CM

## 2020-05-13 DIAGNOSIS — Z13.220 SCREENING FOR LIPID DISORDERS: ICD-10-CM

## 2020-05-13 PROCEDURE — 99396 PREV VISIT EST AGE 40-64: CPT | Performed by: FAMILY MEDICINE

## 2020-05-13 PROCEDURE — 1036F TOBACCO NON-USER: CPT | Performed by: FAMILY MEDICINE

## 2020-05-13 PROCEDURE — 99214 OFFICE O/P EST MOD 30 MIN: CPT | Performed by: FAMILY MEDICINE

## 2020-05-13 PROCEDURE — 3008F BODY MASS INDEX DOCD: CPT | Performed by: FAMILY MEDICINE

## 2020-08-11 ENCOUNTER — TELEPHONE (OUTPATIENT)
Dept: OBGYN CLINIC | Facility: CLINIC | Age: 60
End: 2020-08-11

## 2020-08-11 NOTE — TELEPHONE ENCOUNTER
Patient calling to see if we received records from Dr Harper Search office with  #[phone]  Please let her know if we did not get them she will call and request them to be sen again       NP sees Mckayla Scales in September

## 2020-08-13 NOTE — TELEPHONE ENCOUNTER
Patient calling in about the records, paperwork, if they came through  Does she need to have her blood drawn prior her appointment with Dr Alyson Montana? Please give you call back to advise her further       [phone]

## 2020-09-11 ENCOUNTER — OFFICE VISIT (OUTPATIENT)
Dept: RHEUMATOLOGY | Facility: CLINIC | Age: 60
End: 2020-09-11
Payer: COMMERCIAL

## 2020-09-11 VITALS
HEIGHT: 62 IN | BODY MASS INDEX: 20.35 KG/M2 | SYSTOLIC BLOOD PRESSURE: 110 MMHG | TEMPERATURE: 98.1 F | WEIGHT: 110.6 LBS | DIASTOLIC BLOOD PRESSURE: 70 MMHG

## 2020-09-11 DIAGNOSIS — M25.50 POLYARTHRALGIA: ICD-10-CM

## 2020-09-11 DIAGNOSIS — I73.00 RAYNAUD'S SYNDROME WITHOUT GANGRENE: ICD-10-CM

## 2020-09-11 DIAGNOSIS — Z78.0 POST-MENOPAUSE: ICD-10-CM

## 2020-09-11 DIAGNOSIS — Z79.899 LONG-TERM USE OF PLAQUENIL: ICD-10-CM

## 2020-09-11 DIAGNOSIS — M85.852 OSTEOPENIA OF LEFT HIP: ICD-10-CM

## 2020-09-11 DIAGNOSIS — M34.9 LIMITED SCLERODERMA (HCC): Primary | ICD-10-CM

## 2020-09-11 PROCEDURE — 1036F TOBACCO NON-USER: CPT | Performed by: INTERNAL MEDICINE

## 2020-09-11 PROCEDURE — 99245 OFF/OP CONSLTJ NEW/EST HI 55: CPT | Performed by: INTERNAL MEDICINE

## 2020-09-11 RX ORDER — NIFEDIPINE 30 MG/1
30 TABLET, EXTENDED RELEASE ORAL DAILY
Qty: 90 TABLET | Refills: 1 | Status: SHIPPED | OUTPATIENT
Start: 2020-09-11 | End: 2020-12-11 | Stop reason: SDUPTHER

## 2020-09-11 RX ORDER — FLUOXETINE HYDROCHLORIDE 20 MG/1
20 CAPSULE ORAL DAILY
Qty: 90 CAPSULE | Refills: 1 | Status: SHIPPED | OUTPATIENT
Start: 2020-09-11 | End: 2020-12-11 | Stop reason: SDUPTHER

## 2020-09-11 RX ORDER — HYDROXYCHLOROQUINE SULFATE 200 MG/1
200 TABLET, FILM COATED ORAL DAILY
Qty: 90 TABLET | Refills: 1 | Status: SHIPPED | OUTPATIENT
Start: 2020-09-11 | End: 2020-12-11 | Stop reason: SDUPTHER

## 2020-09-11 NOTE — PROGRESS NOTES
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 and Raynaud's  So far she has not had skin telangiectasias, GI issues, or evidence of pulmonary hypertension on screening PFTs  She has had issues with her Raynaud's in the past with skin cracking, she has not had nonhealing wounds  She currently is on nifedipine and fluoxetine for the Raynaud's which she does feel helps  She reports that she did try sildenafil at some point but it was too expensive  She also is on hydroxychloroquine which seems like it was added for her skin disease and arthralgia in her hands  Patient is unsure if it helps and has not tried going off of it previously  She is up-to-date with her Plaquenil eye exam   Her exam today does look consistent with limited scleroderma as she has mild skin tightening on her forehead and around her mouth along with moderate sclerodactyly distal to the PIP joints in the hands and milder in the toes  I do not have the original labs from her workup and so we discussed we will do a repeat workup at this time  She did have a normal PFTs in October of last year and we discussed since they were completely normal and she is also not having any concerning respiratory symptoms, we can plan to repeat these in about 1 year and continue to repeat them every 1-2 years depending on the results  We also will hold off on echocardiogram at this time since the PFTs were completely normal and she also denies any concerning symptoms such as dyspnea, cough, chest pain, or lower extremity edema  She does report having previously normal CT of the chest for screening of ILD, but we also discussed that ILD is not a common feature seen with limited scleroderma and is more commonly seen with systemic sclerosis    She will continue on her current medications and we will plan a follow-up in the winter in case she needs additional treatment for her Raynaud's at that time  For her osteopenia, she is overdue for a DEXA at this time, her last 1 was in 2017 and showed osteopenia  I did order a DEXA for her and she will try to have that done before our follow-up visit  Her last FRAX score was a while and she has not required any treatment for this so far  Plan:  Diagnoses and all orders for this visit:    Limited scleroderma ([address] )  -     Ambulatory referral to Rheumatology  -     hydroxychloroquine (Plaquenil) 200 mg tablet; Take 1 tablet (200 mg total) by mouth daily    Raynaud's syndrome without gangrene  -     Ambulatory referral to Rheumatology  -     Protein electrophoresis, serum  -     TSH, 3rd generation with Free T4 reflex  -     Vitamin D 25 hydroxy  -     Chronic Hepatitis Panel  -     PTH, intact  -     ERIK Screen w/ Reflex to Titer/Pattern  -     Anti-DNA antibody, double-stranded  -     Anti-Tiffany 1 Antibody  -     Anti-scleroderma antibody  -     Comprehensive metabolic panel  -     C-reactive protein  -     Centromere Antibody  -     C3 complement  -     C4 complement  -     CBC and differential  -     Cyclic citrul peptide antibody, IgG  -     Sjogren's Antibodies  -     Sm and Sm/RNP Antibodies  -     Sedimentation rate, automated  -     RF Screen w/ Reflex to Titer  -     NIFEdipine (PROCARDIA XL) 30 mg 24 hr tablet; Take 1 tablet (30 mg total) by mouth daily  -     hydroxychloroquine (Plaquenil) 200 mg tablet; Take 1 tablet (200 mg total) by mouth daily  -     FLUoxetine (PROzac) 20 mg capsule; Take 1 capsule (20 mg total) by mouth daily    Long-term use of Plaquenil    Osteopenia of left hip  -     TSH, 3rd generation with Free T4 reflex  -     Vitamin D 25 hydroxy  -     DXA bone density spine hip and pelvis;  Future    Post-menopause  -     TSH, 3rd generation with Free T4 reflex  -     Vitamin D 25 hydroxy  - PTH, intact  -     DXA bone density spine hip and pelvis; Future    Polyarthralgia  -     Protein electrophoresis, serum  -     TSH, 3rd generation with Free T4 reflex  -     Vitamin D 25 hydroxy  -     Chronic Hepatitis Panel  -     PTH, intact  -     ERIK Screen w/ Reflex to Titer/Pattern  -     Anti-DNA antibody, double-stranded  -     Anti-Tiffany 1 Antibody  -     Anti-scleroderma antibody  -     Comprehensive metabolic panel  -     C-reactive protein  -     Centromere Antibody  -     C3 complement  -     C4 complement  -     CBC and differential  -     Cyclic citrul peptide antibody, IgG  -     Sjogren's Antibodies  -     Sm and Sm/RNP Antibodies  -     Sedimentation rate, automated  -     RF Screen w/ Reflex to Titer  -     hydroxychloroquine (Plaquenil) 200 mg tablet; Take 1 tablet (200 mg total) by mouth daily        Follow-up plan: 3 months       HPI  Smitha Hesnley is a 63 y o   female lung nodules, h/o latent TB s/p tx as child, thyroid nodule, osteopenia who presents for rheumatology consult by request of Dr Frank Chandra for scleroderma and raynaud's  Patient presents to establish care for limited scleroderma ([facility]), previously followed with Dr Alexandra Mukherjee, available records were reviewed:   -Dx around 2015, features of Raynauds, sclerodactyly up to MCPs B/L, facial tightness with furrowing around mouth, no telangiectasias or evidence of PAH; also has arthralgias in hands with persistent puffiness in fingers   -Also tried sildenafil in past but was too expensive  -PFTs 10/21/19: normal, no obst/rest lung disease, normal DLCO  She also has Osteopenia   -DEXA 2017: T -2 lumbar, FRAX 0 4/6% for hip/major fracture  -No tx indicated thus far     Patient is transitioning care here  She recalls in 2015, she had work-up by PCP for sx of raynaud's, raynaud's had been ongoing for several years prior to this  Reports she was told labs were positive for "sjogren's" but was dx with limited scleroderma by dr Alexandra Mukherjee     Typically gets dark purple/black, some white but less  Has had prior cracking in the tips of her fingers, has not had non-healing wounds  Has noticed over the years she cannot get her rings off and fingers have remained puffy  Sometimes has morning stiffness in fingers depending on activity such as gardening  She presents on plaquenil which has helped hand arthralgias, has never tried going off the Plaquenil  She has been on it since time of diagnosis  She reports she is up-to-date with her annual eye exam for Plaquenil which has been normal   She is also taking nifedipine and fluoxetine for raynauds  She notices if she misses a dose of nifedipine she definitely notices a difference with worsening raynaud's so does think it helps  Has dry eyes and mouth, notices she cannot whistle anymore  She has noticed she cannot open her mouth as wide anymore  No major dental issues or multiple cavities  Denies any issues with GERD, or bowel issues  No dyspnea, cough, MAGDALENO, chest pain  Reports having previous CT [facility] through Dr Maximiliano Crook for screening of interstitial lung disease and she reports that was normal, this was a few years ago  Denies photosensitivity, rashes, psoriasis, oral or nasal ulcers, alopecia, h/o pericarditis or pleurisy, h/o blood clots or miscarriages  When she was a child her father had TB and she had exposure, she was tx'd for latent TB as a child  Reports her PPD has come back negative just since age 37  Review of Systems  Review of Systems   Constitutional: Negative for chills, fatigue, fever and unexpected weight change  HENT: Negative for mouth sores and trouble swallowing  Eyes: Negative for pain and visual disturbance  Respiratory: Negative for cough and shortness of breath  Cardiovascular: Negative for chest pain and leg swelling  Gastrointestinal: Negative for abdominal pain, blood in stool, constipation, diarrhea and nausea     Musculoskeletal: Positive for arthralgias  Negative for back pain, joint swelling and myalgias  Skin: Positive for color change (raynaud's)  Negative for rash  +skin tightening fingers  +reduced oral aperture    Neurological: Negative for weakness and numbness  Hematological: Negative for adenopathy  Psychiatric/Behavioral: Negative for sleep disturbance  Allergies  Allergies   Allergen Reactions    Codeine GI Intolerance    Tetracyclines & Related        Home Medications    Current Outpatient Medications:     FLUoxetine (PROzac) 20 mg capsule, Take 1 capsule (20 mg total) by mouth daily, Disp: 90 capsule, Rfl: 1    hydroxychloroquine (Plaquenil) 200 mg tablet, Take 1 tablet (200 mg total) by mouth daily, Disp: 90 tablet, Rfl: 1    NIFEdipine (PROCARDIA XL) 30 mg 24 hr tablet, Take 1 tablet (30 mg total) by mouth daily, Disp: 90 tablet, Rfl: 1    Past Medical History  Past Medical History:   Diagnosis Date    Disease of thyroid gland     Nodule    No known problems     Raynaud's disease        Past Surgical History   Past Surgical History:   Procedure Laterality Date    TOOTH EXTRACTION         Family History  No known family history of autoimmune or inflammatory diseases      Family History   Problem Relation Age of Onset    Skin cancer Mother     Breast cancer Mother     Kidney cancer Father     Lung cancer Father     Cancer Father     Uterine cancer Maternal Grandmother     Breast cancer Other     No Known Problems Brother        Social History  Social History     Substance and Sexual Activity   Alcohol Use Yes    Comment: social     Social History     Substance and Sexual Activity   Drug Use No     Social History     Tobacco Use   Smoking Status Never Smoker   Smokeless Tobacco Never Used       Objective:    Vitals:    09/11/20 0840   BP: 110/70   BP Location: Left arm   Patient Position: Sitting   Cuff Size: Standard   Temp: 98 1 °F (36 7 °C)   TempSrc: Tympanic   Weight: 50 2 kg (110 lb 9 6 oz)   Height: 5' 2" (1 575 m)       Physical Exam  Constitutional:       General: She is not in acute distress  Appearance: She is well-developed  HENT:      Head: Normocephalic and atraumatic  Comments: Mildly reduced opening of oral aperture, furrowing of the skin around the mouth  Eyes:      General: Lids are normal  No scleral icterus  Conjunctiva/sclera: Conjunctivae normal    Neck:      Musculoskeletal: Neck supple  No muscular tenderness  Thyroid: No thyromegaly  Cardiovascular:      Rate and Rhythm: Normal rate and regular rhythm  Heart sounds: S1 normal and S2 normal  No murmur  No friction rub  Pulmonary:      Effort: Pulmonary effort is normal  No tachypnea or respiratory distress  Breath sounds: Normal breath sounds  No wheezing, rhonchi or rales  Musculoskeletal:      Comments: Puffy radha appearance to the fingers  No joint swelling or synovitis anywhere  No reproducible soft tissue or joint tenderness  Lymphadenopathy:      Head:      Right side of head: No submental or submandibular adenopathy  Left side of head: No submental or submandibular adenopathy  Cervical: No cervical adenopathy  Skin:     General: Skin is warm and dry  Findings: No rash  Nails: There is no clubbing  Comments: Light dusky hue to the fingertips  No skin cracking or breakdown  No telangiectasias noted  Mildly reduced opening of oral aperture, furrowing of the skin around the mouth  Modified rodnan skin score: 4  Face-1  Hand from fingertips to PIPs-2  Feet-1  All other areas-0   Neurological:      Mental Status: She is alert  Sensory: Sensation is intact  No sensory deficit  Motor: Motor function is intact  Psychiatric:         Behavior: Behavior normal  Behavior is cooperative  PFTs 10/21/19:  No obstructive or restrictive lung disease noted, lung volumes within normal limits, normal DLCO      Imaging:   DEXA 12/5/17:   RESULTS: LUMBAR SPINE:  L1-L4:   BMD 0 832 gm/cm2   T-score -2 0   Z-score -0 8      LEFT TOTAL HIP:   BMD 1 001 gm/cm2   T-score 0 5   Z-score 1 2      LEFT FEMORAL NECK:   BMD 0 705 gm/cm2   T-score -1 3   Z-score -0 2      IMPRESSION:   1  Based on the [facility] classification, the T-score of -2 0 in the lumbar spine is consistent with low bone mineral density  2   The 10 year risk of hip fracture is 0 4 % with the 10 year risk of major osteoporotic fracture being 6  % as calculated by the WHO fracture risk assessment tool (FRAX)  The current NOF guidelines recommend treating patients with FRAX 10 year risk score of >3% for hip fracture and >20% for major osteoporotic fracture      Labs:   Component      Latest Ref Rng & Units 11/4/2019   Sodium      136 - 145 mmol/L 142   Potassium      3 5 - 5 3 mmol/L 4 0   Chloride      100 - 108 mmol/L 104   CO2      21 - 32 mmol/L 29   Anion Gap      4 - 13 mmol/L 9   BUN      5 - 25 mg/dL 24   Creatinine      0 60 - 1 30 mg/dL 0 81   GLUCOSE FASTING      65 - 99 mg/dL 85   Calcium      8 3 - 10 1 mg/dL 9 0   AST      5 - 45 U/L 22   ALT      12 - 78 U/L 24   Alkaline Phosphatase      46 - 116 U/L 67   Total Protein      6 4 - 8 2 g/dL 7 6   Albumin      3 5 - 5 0 g/dL 4 0   TOTAL BILIRUBIN      0 20 - 1 00 mg/dL 0 30   eGFR      ml/min/1 73sq m 80   Segs Relative      43 - 75 % 42 (L)   Bands Relative      0 - 8 % 1   Lymphocytes %      14 - 44 % 49 (H)   Monocytes      4 - 12 % 4   Eosinophils      0 - 6 % 1   Basophils Relative      0 - 1 % 1   Atypical Lymphocytes %      <=0 % 2 (H)   Abs Neutrophils      1 85 - 7 62 Thousand/uL 1 81 (L)   LYMPHOCYTES ABSOLUTE      0 60 - 4 47 Thousand/uL 2 07   Monocytes Absolute      0 00 - 1 22 Thousand/uL 0 17   Absolute Eosinophils      0 00 - 0 40 Thousand/uL 0 04   Basophils Absolute      0 00 - 0 10 Thousand/uL 0 04   Total Counted       100   Platelet Estimate      Adequate Adequate   WBC      4 31 - 10 16 Thousand/uL 4 22 (L)   Red Blood Cell Count      3 81 - 5 12 Million/uL 4 58   Hemoglobin      11 5 - 15 4 g/dL 14 1   HCT      34 8 - 46 1 % 43 4   MCV      82 - 98 fL 95   MCH      26 8 - 34 3 pg 30 8   MCHC      31 4 - 37 4 g/dL 32 5   RDW      11 6 - 15 1 % 12 2   MPV      8 9 - 12 7 fL 10 2   Platelet Count      737 - 390 Thousands/uL 275   nRBC      /100 WBCs 0   C-REACTIVE PROTEIN      <3 0 mg/L <3 0   Sed Rate      0 - 20 mm/hour 5   Vit D, 25-Hydroxy      30 0 - 100 0 ng/mL 44 7

## 2020-09-17 ENCOUNTER — TRANSCRIBE ORDERS (OUTPATIENT)
Dept: RHEUMATOLOGY | Facility: CLINIC | Age: 60
End: 2020-09-17

## 2020-09-28 ENCOUNTER — APPOINTMENT (OUTPATIENT)
Dept: LAB | Facility: HOSPITAL | Age: 60
End: 2020-09-28
Payer: COMMERCIAL

## 2020-09-28 DIAGNOSIS — Z78.0 POST-MENOPAUSE: ICD-10-CM

## 2020-09-28 DIAGNOSIS — Z13.220 SCREENING FOR LIPID DISORDERS: ICD-10-CM

## 2020-09-28 DIAGNOSIS — Z13.29 SCREENING FOR THYROID DISORDER: ICD-10-CM

## 2020-09-28 DIAGNOSIS — M25.50 PAIN IN JOINT, MULTIPLE SITES: Primary | ICD-10-CM

## 2020-09-28 DIAGNOSIS — M85.852 OSTEOPENIA OF LEFT HIP: ICD-10-CM

## 2020-09-28 LAB
25(OH)D3 SERPL-MCNC: 34.4 NG/ML (ref 30–100)
ALBUMIN SERPL BCP-MCNC: 4 G/DL (ref 3.5–5)
ALP SERPL-CCNC: 66 U/L (ref 46–116)
ALT SERPL W P-5'-P-CCNC: 22 U/L (ref 12–78)
ANION GAP SERPL CALCULATED.3IONS-SCNC: 9 MMOL/L (ref 4–13)
AST SERPL W P-5'-P-CCNC: 18 U/L (ref 5–45)
BASOPHILS # BLD AUTO: 0.05 THOUSANDS/ΜL (ref 0–0.1)
BASOPHILS NFR BLD AUTO: 1 % (ref 0–1)
BILIRUB SERPL-MCNC: 0.4 MG/DL (ref 0.2–1)
BUN SERPL-MCNC: 16 MG/DL (ref 5–25)
C3 SERPL-MCNC: 85.1 MG/DL (ref 90–180)
C4 SERPL-MCNC: 21 MG/DL (ref 10–40)
CALCIUM SERPL-MCNC: 9 MG/DL (ref 8.3–10.1)
CHLORIDE SERPL-SCNC: 102 MMOL/L (ref 100–108)
CHOLEST SERPL-MCNC: 190 MG/DL (ref 50–200)
CO2 SERPL-SCNC: 28 MMOL/L (ref 21–32)
CREAT SERPL-MCNC: 0.75 MG/DL (ref 0.6–1.3)
CRP SERPL QL: <3 MG/L
EOSINOPHIL # BLD AUTO: 0.16 THOUSAND/ΜL (ref 0–0.61)
EOSINOPHIL NFR BLD AUTO: 3 % (ref 0–6)
ERYTHROCYTE [DISTWIDTH] IN BLOOD BY AUTOMATED COUNT: 12 % (ref 11.6–15.1)
ERYTHROCYTE [SEDIMENTATION RATE] IN BLOOD: 6 MM/HOUR (ref 0–29)
GFR SERPL CREATININE-BSD FRML MDRD: 88 ML/MIN/1.73SQ M
GLUCOSE P FAST SERPL-MCNC: 81 MG/DL (ref 65–99)
HBV CORE AB SER QL: NORMAL
HBV CORE IGM SER QL: NORMAL
HBV SURFACE AG SER QL: NORMAL
HCT VFR BLD AUTO: 41.2 % (ref 34.8–46.1)
HCV AB SER QL: NORMAL
HDLC SERPL-MCNC: 78 MG/DL
HGB BLD-MCNC: 13.3 G/DL (ref 11.5–15.4)
IMM GRANULOCYTES # BLD AUTO: 0.01 THOUSAND/UL (ref 0–0.2)
IMM GRANULOCYTES NFR BLD AUTO: 0 % (ref 0–2)
LDLC SERPL CALC-MCNC: 103 MG/DL (ref 0–100)
LYMPHOCYTES # BLD AUTO: 1.76 THOUSANDS/ΜL (ref 0.6–4.47)
LYMPHOCYTES NFR BLD AUTO: 38 % (ref 14–44)
MCH RBC QN AUTO: 30.4 PG (ref 26.8–34.3)
MCHC RBC AUTO-ENTMCNC: 32.3 G/DL (ref 31.4–37.4)
MCV RBC AUTO: 94 FL (ref 82–98)
MONOCYTES # BLD AUTO: 0.45 THOUSAND/ΜL (ref 0.17–1.22)
MONOCYTES NFR BLD AUTO: 10 % (ref 4–12)
NEUTROPHILS # BLD AUTO: 2.23 THOUSANDS/ΜL (ref 1.85–7.62)
NEUTS SEG NFR BLD AUTO: 48 % (ref 43–75)
NRBC BLD AUTO-RTO: 0 /100 WBCS
PLATELET # BLD AUTO: 273 THOUSANDS/UL (ref 149–390)
PMV BLD AUTO: 10 FL (ref 8.9–12.7)
POTASSIUM SERPL-SCNC: 3.8 MMOL/L (ref 3.5–5.3)
PROT SERPL-MCNC: 7.6 G/DL (ref 6.4–8.2)
PTH-INTACT SERPL-MCNC: 45.7 PG/ML (ref 18.4–80.1)
RBC # BLD AUTO: 4.38 MILLION/UL (ref 3.81–5.12)
RHEUMATOID FACT SER QL LA: NEGATIVE
SODIUM SERPL-SCNC: 139 MMOL/L (ref 136–145)
TRIGL SERPL-MCNC: 45 MG/DL
TSH SERPL DL<=0.05 MIU/L-ACNC: 1.76 UIU/ML (ref 0.36–3.74)
WBC # BLD AUTO: 4.66 THOUSAND/UL (ref 4.31–10.16)

## 2020-09-28 PROCEDURE — 86803 HEPATITIS C AB TEST: CPT | Performed by: INTERNAL MEDICINE

## 2020-09-28 PROCEDURE — 85025 COMPLETE CBC W/AUTO DIFF WBC: CPT | Performed by: INTERNAL MEDICINE

## 2020-09-28 PROCEDURE — 80061 LIPID PANEL: CPT

## 2020-09-28 PROCEDURE — 86430 RHEUMATOID FACTOR TEST QUAL: CPT | Performed by: INTERNAL MEDICINE

## 2020-09-28 PROCEDURE — 85652 RBC SED RATE AUTOMATED: CPT | Performed by: INTERNAL MEDICINE

## 2020-09-28 PROCEDURE — 84165 PROTEIN E-PHORESIS SERUM: CPT | Performed by: INTERNAL MEDICINE

## 2020-09-28 PROCEDURE — 86200 CCP ANTIBODY: CPT | Performed by: INTERNAL MEDICINE

## 2020-09-28 PROCEDURE — 86235 NUCLEAR ANTIGEN ANTIBODY: CPT

## 2020-09-28 PROCEDURE — 86160 COMPLEMENT ANTIGEN: CPT | Performed by: INTERNAL MEDICINE

## 2020-09-28 PROCEDURE — 86704 HEP B CORE ANTIBODY TOTAL: CPT | Performed by: INTERNAL MEDICINE

## 2020-09-28 PROCEDURE — 84443 ASSAY THYROID STIM HORMONE: CPT | Performed by: INTERNAL MEDICINE

## 2020-09-28 PROCEDURE — 83970 ASSAY OF PARATHORMONE: CPT | Performed by: INTERNAL MEDICINE

## 2020-09-28 PROCEDURE — 86225 DNA ANTIBODY NATIVE: CPT | Performed by: INTERNAL MEDICINE

## 2020-09-28 PROCEDURE — 36415 COLL VENOUS BLD VENIPUNCTURE: CPT | Performed by: INTERNAL MEDICINE

## 2020-09-28 PROCEDURE — 86705 HEP B CORE ANTIBODY IGM: CPT | Performed by: INTERNAL MEDICINE

## 2020-09-28 PROCEDURE — 82306 VITAMIN D 25 HYDROXY: CPT | Performed by: INTERNAL MEDICINE

## 2020-09-28 PROCEDURE — 86038 ANTINUCLEAR ANTIBODIES: CPT | Performed by: INTERNAL MEDICINE

## 2020-09-28 PROCEDURE — 86039 ANTINUCLEAR ANTIBODIES (ANA): CPT | Performed by: INTERNAL MEDICINE

## 2020-09-28 PROCEDURE — 87340 HEPATITIS B SURFACE AG IA: CPT | Performed by: INTERNAL MEDICINE

## 2020-09-28 PROCEDURE — 84165 PROTEIN E-PHORESIS SERUM: CPT | Performed by: PATHOLOGY

## 2020-09-28 PROCEDURE — 86235 NUCLEAR ANTIGEN ANTIBODY: CPT | Performed by: INTERNAL MEDICINE

## 2020-09-28 PROCEDURE — 86140 C-REACTIVE PROTEIN: CPT | Performed by: INTERNAL MEDICINE

## 2020-09-28 PROCEDURE — 80053 COMPREHEN METABOLIC PANEL: CPT | Performed by: INTERNAL MEDICINE

## 2020-09-29 LAB
CENTROMERE B AB SER-ACNC: <0.2 AI (ref 0–0.9)
DSDNA AB SER-ACNC: <1 IU/ML (ref 0–9)
ENA JO1 AB SER-ACNC: <0.2 AI (ref 0–0.9)
ENA RNP AB SER-ACNC: 0.2 AI (ref 0–0.9)
ENA SCL70 AB SER-ACNC: >8 AI (ref 0–0.9)
ENA SM AB SER-ACNC: <0.2 AI (ref 0–0.9)
ENA SS-A AB SER-ACNC: <0.2 AI (ref 0–0.9)
ENA SS-B AB SER-ACNC: <0.2 AI (ref 0–0.9)

## 2020-09-30 LAB
ALBUMIN SERPL ELPH-MCNC: 4.76 G/DL (ref 3.5–5)
ALBUMIN SERPL ELPH-MCNC: 63.5 % (ref 52–65)
ALPHA1 GLOB SERPL ELPH-MCNC: 0.28 G/DL (ref 0.1–0.4)
ALPHA1 GLOB SERPL ELPH-MCNC: 3.7 % (ref 2.5–5)
ALPHA2 GLOB SERPL ELPH-MCNC: 0.68 G/DL (ref 0.4–1.2)
ALPHA2 GLOB SERPL ELPH-MCNC: 9.1 % (ref 7–13)
ANA HOMOGEN SER QL IF: NORMAL
ANA HOMOGEN TITR SER: NORMAL {TITER}
BETA GLOB ABNORMAL SERPL ELPH-MCNC: 0.44 G/DL (ref 0.4–0.8)
BETA1 GLOB SERPL ELPH-MCNC: 5.9 % (ref 5–13)
BETA2 GLOB SERPL ELPH-MCNC: 4.4 % (ref 2–8)
BETA2+GAMMA GLOB SERPL ELPH-MCNC: 0.33 G/DL (ref 0.2–0.5)
CCP IGA+IGG SERPL IA-ACNC: 3 UNITS (ref 0–19)
GAMMA GLOB ABNORMAL SERPL ELPH-MCNC: 1.01 G/DL (ref 0.5–1.6)
GAMMA GLOB SERPL ELPH-MCNC: 13.4 % (ref 12–22)
IGG/ALB SER: 1.74 {RATIO} (ref 1.1–1.8)
PROT PATTERN SERPL ELPH-IMP: NORMAL
PROT SERPL-MCNC: 7.5 G/DL (ref 6.4–8.2)
RYE IGE QN: POSITIVE

## 2020-11-02 ENCOUNTER — TELEPHONE (OUTPATIENT)
Dept: FAMILY MEDICINE CLINIC | Facility: MEDICAL CENTER | Age: 60
End: 2020-11-02

## 2020-11-04 ENCOUNTER — HOSPITAL ENCOUNTER (OUTPATIENT)
Dept: MAMMOGRAPHY | Facility: CLINIC | Age: 60
Discharge: HOME | End: 2020-11-04
Payer: COMMERCIAL

## 2020-11-04 PROCEDURE — 77080 DXA BONE DENSITY AXIAL: CPT

## 2020-11-30 ENCOUNTER — TELEPHONE (OUTPATIENT)
Dept: ADMINISTRATIVE | Facility: OTHER | Age: 60
End: 2020-11-30

## 2020-12-11 ENCOUNTER — OFFICE VISIT (OUTPATIENT)
Dept: RHEUMATOLOGY | Facility: CLINIC | Age: 60
End: 2020-12-11
Payer: COMMERCIAL

## 2020-12-11 VITALS — WEIGHT: 110.67 LBS | HEIGHT: 62 IN | BODY MASS INDEX: 20.37 KG/M2

## 2020-12-11 DIAGNOSIS — I73.00 RAYNAUD'S SYNDROME WITHOUT GANGRENE: ICD-10-CM

## 2020-12-11 DIAGNOSIS — Z79.899 LONG-TERM USE OF PLAQUENIL: ICD-10-CM

## 2020-12-11 DIAGNOSIS — R06.00 DYSPNEA ON EXERTION: ICD-10-CM

## 2020-12-11 DIAGNOSIS — M34.9 LIMITED SCLERODERMA (HCC): Primary | ICD-10-CM

## 2020-12-11 DIAGNOSIS — M25.50 POLYARTHRALGIA: ICD-10-CM

## 2020-12-11 PROCEDURE — 99215 OFFICE O/P EST HI 40 MIN: CPT | Performed by: INTERNAL MEDICINE

## 2020-12-11 PROCEDURE — 3008F BODY MASS INDEX DOCD: CPT | Performed by: INTERNAL MEDICINE

## 2020-12-11 PROCEDURE — 1036F TOBACCO NON-USER: CPT | Performed by: INTERNAL MEDICINE

## 2020-12-11 RX ORDER — HYDROXYCHLOROQUINE SULFATE 200 MG/1
200 TABLET, FILM COATED ORAL DAILY
Qty: 90 TABLET | Refills: 1 | Status: SHIPPED | OUTPATIENT
Start: 2020-12-11 | End: 2021-08-13 | Stop reason: SDUPTHER

## 2020-12-11 RX ORDER — NIFEDIPINE 30 MG/1
60 TABLET, EXTENDED RELEASE ORAL DAILY
Qty: 180 TABLET | Refills: 1 | Status: SHIPPED | OUTPATIENT
Start: 2020-12-11 | End: 2022-01-10 | Stop reason: SDUPTHER

## 2020-12-11 RX ORDER — FLUOXETINE HYDROCHLORIDE 20 MG/1
20 CAPSULE ORAL DAILY
Qty: 90 CAPSULE | Refills: 1 | Status: SHIPPED | OUTPATIENT
Start: 2020-12-11 | End: 2021-10-08 | Stop reason: SDUPTHER

## 2021-05-25 ENCOUNTER — OFFICE VISIT (OUTPATIENT)
Dept: FAMILY MEDICINE CLINIC | Facility: MEDICAL CENTER | Age: 61
End: 2021-05-25
Payer: COMMERCIAL

## 2021-05-25 VITALS
OXYGEN SATURATION: 98 % | TEMPERATURE: 98 F | SYSTOLIC BLOOD PRESSURE: 105 MMHG | DIASTOLIC BLOOD PRESSURE: 70 MMHG | HEART RATE: 68 BPM | HEIGHT: 62 IN | BODY MASS INDEX: 20.72 KG/M2 | WEIGHT: 112.6 LBS

## 2021-05-25 DIAGNOSIS — I73.00 RAYNAUD'S SYNDROME WITHOUT GANGRENE: ICD-10-CM

## 2021-05-25 DIAGNOSIS — E04.1 THYROID NODULE: Primary | ICD-10-CM

## 2021-05-25 DIAGNOSIS — M85.88 OSTEOPENIA OF SPINE: ICD-10-CM

## 2021-05-25 DIAGNOSIS — G56.01 CARPAL TUNNEL SYNDROME OF RIGHT WRIST: ICD-10-CM

## 2021-05-25 PROCEDURE — 3008F BODY MASS INDEX DOCD: CPT | Performed by: FAMILY MEDICINE

## 2021-05-25 PROCEDURE — 3725F SCREEN DEPRESSION PERFORMED: CPT | Performed by: FAMILY MEDICINE

## 2021-05-25 PROCEDURE — 99213 OFFICE O/P EST LOW 20 MIN: CPT | Performed by: FAMILY MEDICINE

## 2021-05-25 PROCEDURE — 1036F TOBACCO NON-USER: CPT | Performed by: FAMILY MEDICINE

## 2021-05-25 PROCEDURE — 99396 PREV VISIT EST AGE 40-64: CPT | Performed by: FAMILY MEDICINE

## 2021-05-25 NOTE — ASSESSMENT & PLAN NOTE
Patient has noticed that the thyroid nodule is enlarging  Will check an ultrasound  Workup from there

## 2021-05-25 NOTE — PROGRESS NOTES
Assessment/Plan:    Thyroid nodule  Patient has noticed that the thyroid nodule is enlarging  Will check an ultrasound  Workup from there  Raynaud's syndrome without gangrene  Currently follows up with rheumatologist     She is taking Procardia 30 mg she cannot tolerate higher dose  She is also taking Plaquenil and fluoxetine  Osteopenia  There has been some decrease in bone density, but is still osteopenia not osteoporosis  Will follow up with rheumatologist     Carpal tunnel syndrome  Still with CTS symptoms  She would like to avoid a procedure if possible  She does have splints  Problem List Items Addressed This Visit        Endocrine    Thyroid nodule - Primary     Patient has noticed that the thyroid nodule is enlarging  Will check an ultrasound  Workup from there  Relevant Orders    US thyroid       Cardiovascular and Mediastinum    Raynaud's syndrome without gangrene     Currently follows up with rheumatologist     She is taking Procardia 30 mg she cannot tolerate higher dose  She is also taking Plaquenil and fluoxetine  Relevant Orders    US thyroid       Nervous and Auditory    Carpal tunnel syndrome     Still with CTS symptoms  She would like to avoid a procedure if possible  She does have splints  Musculoskeletal and Integument    Osteopenia     There has been some decrease in bone density, but is still osteopenia not osteoporosis  Will follow up with rheumatologist          Relevant Orders    US thyroid            Subjective:      Patient ID: Biju Kerr is a 62 y o  female  HPI    The following portions of the patient's history were reviewed and updated as appropriate: allergies, current medications, past family history, past medical history, past social history, past surgical history and problem list     Review of Systems   Constitutional: Negative for activity change, appetite change, fatigue and fever     HENT: Negative for congestion, ear pain, hearing loss, nosebleeds, postnasal drip, rhinorrhea and trouble swallowing  Eyes: Negative for photophobia, pain, redness and visual disturbance  Respiratory: Negative for cough, chest tightness, shortness of breath and wheezing  Cardiovascular: Negative for chest pain and palpitations  Gastrointestinal: Negative for abdominal pain, blood in stool, constipation, diarrhea, nausea and vomiting  Endocrine: Negative for cold intolerance, heat intolerance, polydipsia, polyphagia and polyuria  Genitourinary: Negative for difficulty urinating, dyspareunia, dysuria, flank pain, frequency, menstrual problem, pelvic pain, urgency, vaginal bleeding and vaginal discharge  Musculoskeletal: Negative for arthralgias, gait problem, joint swelling and myalgias  Skin: Negative for rash and wound  Neurological: Negative for dizziness, tremors, seizures, syncope, speech difficulty, weakness, light-headedness and headaches  Psychiatric/Behavioral: Negative for agitation, decreased concentration, dysphoric mood, sleep disturbance and suicidal ideas  The patient is not nervous/anxious  Objective:      /70 (BP Location: Left arm, Patient Position: Sitting, Cuff Size: Adult)   Pulse 68   Temp 98 °F (36 7 °C)   Ht 5' 2" (1 575 m)   Wt 51 1 kg (112 lb 9 6 oz)   SpO2 98%   BMI 20 59 kg/m²          Physical Exam  Vitals signs and nursing note reviewed  Constitutional:       Appearance: Normal appearance  She is well-developed  HENT:      Head: Normocephalic  Right Ear: Hearing, ear canal and external ear normal       Left Ear: Hearing, tympanic membrane, ear canal and external ear normal       Nose: Nose normal  No mucosal edema or rhinorrhea  Mouth/Throat:      Pharynx: Uvula midline  No oropharyngeal exudate  Eyes:      General: Lids are normal       Conjunctiva/sclera: Conjunctivae normal       Pupils: Pupils are equal, round, and reactive to light     Neck: Thyroid: No thyroid mass or thyromegaly  Vascular: No carotid bruit  Cardiovascular:      Rate and Rhythm: Normal rate and regular rhythm  Pulses: Normal pulses  Heart sounds: Normal heart sounds, S1 normal and S2 normal  No murmur  No gallop  Pulmonary:      Effort: Pulmonary effort is normal       Breath sounds: Normal breath sounds  No wheezing or rales  Abdominal:      General: Bowel sounds are normal       Palpations: Abdomen is soft  Tenderness: There is no abdominal tenderness  Hernia: No hernia is present  Musculoskeletal: Normal range of motion  Lymphadenopathy:      Cervical: No cervical adenopathy  Skin:     General: Skin is warm and dry  Findings: No rash  Neurological:      Mental Status: She is oriented to person, place, and time  Cranial Nerves: No cranial nerve deficit  Sensory: No sensory deficit  Coordination: Coordination normal    Psychiatric:         Speech: Speech normal          Behavior: Behavior normal  Behavior is cooperative  Thought Content:  Thought content normal          Judgment: Judgment normal

## 2021-05-25 NOTE — ASSESSMENT & PLAN NOTE
Currently follows up with rheumatologist     She is taking Procardia 30 mg she cannot tolerate higher dose  She is also taking Plaquenil and fluoxetine

## 2021-06-13 ENCOUNTER — HOSPITAL ENCOUNTER (OUTPATIENT)
Dept: ULTRASOUND IMAGING | Facility: HOSPITAL | Age: 61
Discharge: HOME | End: 2021-06-13
Payer: COMMERCIAL

## 2021-06-13 DIAGNOSIS — I73.00 RAYNAUD'S SYNDROME WITHOUT GANGRENE: ICD-10-CM

## 2021-06-13 DIAGNOSIS — E04.1 THYROID NODULE: ICD-10-CM

## 2021-06-13 DIAGNOSIS — M85.88 OSTEOPENIA OF SPINE: ICD-10-CM

## 2021-06-13 PROCEDURE — 76536 US EXAM OF HEAD AND NECK: CPT

## 2021-07-14 ENCOUNTER — CONSULT (OUTPATIENT)
Dept: SURGERY | Facility: CLINIC | Age: 61
End: 2021-07-14
Payer: COMMERCIAL

## 2021-07-14 VITALS
HEART RATE: 66 BPM | SYSTOLIC BLOOD PRESSURE: 104 MMHG | WEIGHT: 112 LBS | HEIGHT: 60 IN | DIASTOLIC BLOOD PRESSURE: 68 MMHG | BODY MASS INDEX: 21.99 KG/M2

## 2021-07-14 DIAGNOSIS — E04.1 THYROID NODULE: Primary | ICD-10-CM

## 2021-07-14 PROCEDURE — 3008F BODY MASS INDEX DOCD: CPT | Performed by: SURGERY

## 2021-07-14 PROCEDURE — 88173 CYTOPATH EVAL FNA REPORT: CPT | Performed by: PATHOLOGY

## 2021-07-14 PROCEDURE — 1036F TOBACCO NON-USER: CPT | Performed by: SURGERY

## 2021-07-14 PROCEDURE — 10021 FNA BX W/O IMG GDN 1ST LES: CPT | Performed by: SURGERY

## 2021-07-14 PROCEDURE — 99213 OFFICE O/P EST LOW 20 MIN: CPT | Performed by: SURGERY

## 2021-07-14 NOTE — PROGRESS NOTES
Office Visit - General Surgery  More Gonzalez MRN: [id number]  Encounter: [id number]    Assessment and Plan    Problem List Items Addressed This Visit        Endocrine    Thyroid nodule - Primary       The ultrasound was reviewed personally and then a  FNAB of the thyroid nodule was done here in the office     I will give her call with results when available to determine future course of therapy  Relevant Orders    Non-gynecologic cytology          Chief Complaint:  More Gonzalez is a 62 y o  female who presents for Follow-up    Subjective  62 Year old female who is known thyroid nodule  This nodule recently increased in size  She had an ultrasound done which also showed  An increase in size  She has no real compressive  Symptoms    No hyper or hypothyroid symptoms    Past Medical History  Past Medical History:   Diagnosis Date    Arthritis     Disease of thyroid gland     Nodule    No known problems     Raynaud's disease        Past Surgical History  Past Surgical History:   Procedure Laterality Date    TOOTH EXTRACTION         Family History  Family History   Problem Relation Age of Onset    Skin cancer Mother     Breast cancer Mother     Cancer Mother         Breast & Melanoma    Vision loss Mother     Kidney cancer Father     Lung cancer Father     Cancer Father         Lung    Uterine cancer Maternal Grandmother     Arthritis Maternal Grandmother     Cancer Maternal Grandmother         Uterine    Breast cancer Other     No Known Problems Brother     Cancer Maternal Aunt         Breast    Cancer Paternal Aunt         Kidney    Cancer Paternal Aunt         Gynological       Social History  Social History     Socioeconomic History    Marital status: /Civil Union     Spouse name: None    Number of children: None    Years of education: None    Highest education level: None   Occupational History    Occupation: Full-Time Employment   Tobacco Use    Smoking status: Never Smoker    Smokeless tobacco: Never Used   Substance and Sexual Activity    Alcohol use: Yes     Alcohol/week: 7 0 standard drinks     Types: 3 Glasses of wine, 3 Cans of beer, 1 Standard drinks or equivalent per week     Comment: social    Drug use: No    Sexual activity: Yes     Partners: Male     Birth control/protection: Post-menopausal, Male Sterilization, Other   Other Topics Concern    None   Social History Narrative    Always uses seat belt     Social Determinants of Health     Financial Resource Strain:     Difficulty of Paying Living Expenses:    Food Insecurity:     Worried About Running Out of Food in the Last Year:     Ran Out of Food in the Last Year:    Transportation Needs:     Lack of Transportation (Medical):  Lack of Transportation (Non-Medical):    Physical Activity:     Days of Exercise per Week:     Minutes of Exercise per Session:    Stress:     Feeling of Stress :    Social Connections:     Frequency of Communication with Friends and Family:     Frequency of Social Gatherings with Friends and Family:     Attends Hindu Services:     Active Member of Clubs or Organizations:     Attends Club or Organization Meetings:     Marital Status:    Intimate Partner Violence:     Fear of Current or Ex-Partner:     Emotionally Abused:     Physically Abused:     Sexually Abused:         Medications  Current Outpatient Medications on File Prior to Visit   Medication Sig Dispense Refill    FLUoxetine (PROzac) 20 mg capsule Take 1 capsule (20 mg total) by mouth daily 90 capsule 1    NIFEdipine (PROCARDIA XL) 30 mg 24 hr tablet Take 2 tablets (60 mg total) by mouth daily 180 tablet 1    hydroxychloroquine (Plaquenil) 200 mg tablet Take 1 tablet (200 mg total) by mouth daily 90 tablet 1     No current facility-administered medications on file prior to visit         Allergies  Allergies   Allergen Reactions    Codeine GI Intolerance    Tetracyclines & Related        Review of Systems   Constitutional: Positive for fatigue  Negative for chills and fever  HENT: Negative for trouble swallowing and voice change  Eyes: Negative for pain and visual disturbance  Respiratory: Negative for cough and shortness of breath  Cardiovascular: Negative for chest pain and leg swelling  Gastrointestinal: Negative for abdominal pain, nausea and vomiting  Endocrine: Positive for cold intolerance  Negative for heat intolerance, polydipsia, polyphagia and polyuria  Genitourinary: Negative for difficulty urinating and flank pain  Musculoskeletal: Negative for arthralgias and gait problem  Skin: Negative for rash and wound  Allergic/Immunologic: Negative for food allergies  Neurological: Negative for seizures, syncope, weakness and headaches  Hematological: Negative for adenopathy  Psychiatric/Behavioral: Negative for confusion  All other systems reviewed and are negative  Objective  Vitals:    07/14/21 0929   BP: 104/68   Pulse: 66         Physical Exam  Vitals and nursing note reviewed  Constitutional:       General: She is not in acute distress  Appearance: She is well-developed  She is not diaphoretic  HENT:      Head: Normocephalic and atraumatic  Right Ear: External ear normal       Left Ear: External ear normal    Eyes:      General: No scleral icterus  Conjunctiva/sclera: Conjunctivae normal    Neck:      Thyroid: No thyromegaly  Trachea: No tracheal deviation  Comments: Right thyroid has about a 2 and half to 3 cm nodule, no other nodes or thyroid masses appreciated  Cardiovascular:      Rate and Rhythm: Normal rate and regular rhythm  Heart sounds: Normal heart sounds  No murmur heard  No friction rub  Pulmonary:      Effort: Pulmonary effort is normal  No respiratory distress  Breath sounds: Normal breath sounds  No wheezing or rales  Abdominal:      General: There is no distension  Palpations: Abdomen is soft   There is no mass  Tenderness: There is no abdominal tenderness  There is no guarding or rebound  Musculoskeletal:         General: Normal range of motion  Cervical back: Neck supple  Lymphadenopathy:      Cervical: No cervical adenopathy  Skin:     General: Skin is warm and dry  Neurological:      Mental Status: She is alert and oriented to person, place, and time  Psychiatric:         Behavior: Behavior normal          Thought Content: Thought content normal          Judgment: Judgment normal        Procedures    after permission, patient's right neck over the nodule was prepped  With alcohol  Time-out was taken  Local anesthesia 1% lidocaine with epi was infiltrated  A total of 1 mL was used  A 21 gauge needle was then introduced into the thyroid nodule in numerous passes were made  Two slides were made in air dried, the remaining material was placed in CytoLyt solution  Band-Aid dressing applied  Tolerated procedure well

## 2021-07-21 ENCOUNTER — TELEPHONE (OUTPATIENT)
Dept: SURGERY | Facility: CLINIC | Age: 61
End: 2021-07-21

## 2021-07-21 DIAGNOSIS — E04.1 THYROID NODULE: Primary | ICD-10-CM

## 2021-07-21 NOTE — TELEPHONE ENCOUNTER
The patient called inquiring about her needle biopsy results  Explained to her that it was a [place] class 3 which is of indeterminate significance  Explained the classification system and what the options would be  I told her the options would be proceed with surgery and remove that lobe of the thyroid verses waiting a couple months and repeating the test with the Afirma testing  She has sounds comfortable enough waiting for repeat test   Will get her back in a few months to have Afirma testing on this thyroid nodule ago from there

## 2021-08-13 DIAGNOSIS — I73.00 RAYNAUD'S SYNDROME WITHOUT GANGRENE: ICD-10-CM

## 2021-08-13 DIAGNOSIS — M34.9 LIMITED SCLERODERMA (HCC): ICD-10-CM

## 2021-08-13 DIAGNOSIS — M25.50 POLYARTHRALGIA: ICD-10-CM

## 2021-08-13 RX ORDER — HYDROXYCHLOROQUINE SULFATE 200 MG/1
200 TABLET, FILM COATED ORAL DAILY
Qty: 90 TABLET | Refills: 1 | Status: SHIPPED | OUTPATIENT
Start: 2021-08-13 | End: 2022-03-23 | Stop reason: SDUPTHER

## 2021-08-26 ENCOUNTER — HOSPITAL ENCOUNTER (OUTPATIENT)
Dept: RADIOLOGY | Facility: HOSPITAL | Age: 61
Discharge: HOME | End: 2021-08-26
Payer: COMMERCIAL

## 2021-08-26 ENCOUNTER — HOSPITAL ENCOUNTER (OUTPATIENT)
Dept: PULMONOLOGY | Facility: HOSPITAL | Age: 61
Discharge: HOME | End: 2021-08-26
Payer: COMMERCIAL

## 2021-08-26 DIAGNOSIS — M34.9 LIMITED SCLERODERMA (HCC): ICD-10-CM

## 2021-08-26 DIAGNOSIS — R06.00 DYSPNEA ON EXERTION: ICD-10-CM

## 2021-08-26 PROCEDURE — 94729 DIFFUSING CAPACITY: CPT | Performed by: INTERNAL MEDICINE

## 2021-08-26 PROCEDURE — 94760 N-INVAS EAR/PLS OXIMETRY 1: CPT

## 2021-08-26 PROCEDURE — 71046 X-RAY EXAM CHEST 2 VIEWS: CPT

## 2021-08-26 PROCEDURE — 94727 GAS DIL/WSHOT DETER LNG VOL: CPT | Performed by: INTERNAL MEDICINE

## 2021-08-26 PROCEDURE — 94727 GAS DIL/WSHOT DETER LNG VOL: CPT

## 2021-08-26 PROCEDURE — 94060 EVALUATION OF WHEEZING: CPT

## 2021-08-26 PROCEDURE — 94729 DIFFUSING CAPACITY: CPT

## 2021-08-26 PROCEDURE — 94060 EVALUATION OF WHEEZING: CPT | Performed by: INTERNAL MEDICINE

## 2021-08-26 RX ORDER — ALBUTEROL SULFATE 2.5 MG/3ML
2.5 SOLUTION RESPIRATORY (INHALATION) ONCE
Status: COMPLETED | OUTPATIENT
Start: 2021-08-26 | End: 2021-08-26

## 2021-08-26 RX ADMIN — ALBUTEROL SULFATE 2.5 MG: 2.5 SOLUTION RESPIRATORY (INHALATION) at 09:04

## 2021-09-20 ENCOUNTER — TELEPHONE (OUTPATIENT)
Dept: INTERVENTIONAL RADIOLOGY/VASCULAR | Facility: HOSPITAL | Age: 61
End: 2021-09-20

## 2021-09-23 ENCOUNTER — HOSPITAL ENCOUNTER (OUTPATIENT)
Dept: ULTRASOUND IMAGING | Facility: HOSPITAL | Age: 61
Discharge: HOME | End: 2021-09-23
Attending: SURGERY | Admitting: RADIOLOGY
Payer: COMMERCIAL

## 2021-09-23 DIAGNOSIS — E04.1 THYROID NODULE: ICD-10-CM

## 2021-09-23 PROCEDURE — 88172 CYTP DX EVAL FNA 1ST EA SITE: CPT | Performed by: PATHOLOGY

## 2021-09-23 PROCEDURE — 88173 CYTOPATH EVAL FNA REPORT: CPT | Performed by: PATHOLOGY

## 2021-09-23 PROCEDURE — 10005 FNA BX W/US GDN 1ST LES: CPT

## 2021-09-23 RX ORDER — LIDOCAINE HYDROCHLORIDE 10 MG/ML
5 INJECTION, SOLUTION EPIDURAL; INFILTRATION; INTRACAUDAL; PERINEURAL ONCE
Status: DISCONTINUED | OUTPATIENT
Start: 2021-09-23 | End: 2021-09-27 | Stop reason: HOSPADM

## 2021-11-15 ENCOUNTER — OFFICE VISIT (OUTPATIENT)
Dept: RHEUMATOLOGY | Facility: CLINIC | Age: 61
End: 2021-11-15
Payer: COMMERCIAL

## 2021-11-15 VITALS
BODY MASS INDEX: 21.99 KG/M2 | SYSTOLIC BLOOD PRESSURE: 106 MMHG | WEIGHT: 112 LBS | DIASTOLIC BLOOD PRESSURE: 70 MMHG | HEIGHT: 60 IN

## 2021-11-15 DIAGNOSIS — M85.852 OSTEOPENIA OF LEFT HIP: ICD-10-CM

## 2021-11-15 DIAGNOSIS — M34.9 LIMITED SCLERODERMA (HCC): Primary | ICD-10-CM

## 2021-11-15 DIAGNOSIS — I73.00 RAYNAUD'S SYNDROME WITHOUT GANGRENE: ICD-10-CM

## 2021-11-15 DIAGNOSIS — Z79.899 LONG-TERM USE OF PLAQUENIL: ICD-10-CM

## 2021-11-15 DIAGNOSIS — Z78.0 POST-MENOPAUSE: ICD-10-CM

## 2021-11-15 PROCEDURE — 3008F BODY MASS INDEX DOCD: CPT | Performed by: INTERNAL MEDICINE

## 2021-11-15 PROCEDURE — 99214 OFFICE O/P EST MOD 30 MIN: CPT | Performed by: INTERNAL MEDICINE

## 2021-11-15 PROCEDURE — 1036F TOBACCO NON-USER: CPT | Performed by: INTERNAL MEDICINE

## 2022-01-25 ENCOUNTER — OFFICE VISIT (OUTPATIENT)
Dept: SURGERY | Facility: CLINIC | Age: 62
End: 2022-01-25
Payer: COMMERCIAL

## 2022-01-25 VITALS — WEIGHT: 115.4 LBS | TEMPERATURE: 97.3 F | HEIGHT: 60 IN | BODY MASS INDEX: 22.65 KG/M2

## 2022-01-25 DIAGNOSIS — E04.1 THYROID NODULE: Primary | ICD-10-CM

## 2022-01-25 PROCEDURE — 1036F TOBACCO NON-USER: CPT | Performed by: SURGERY

## 2022-01-25 PROCEDURE — 3008F BODY MASS INDEX DOCD: CPT | Performed by: SURGERY

## 2022-01-25 PROCEDURE — 99213 OFFICE O/P EST LOW 20 MIN: CPT | Performed by: SURGERY

## 2022-01-25 NOTE — PROGRESS NOTES
Office Visit - General Surgery  Britta Thompson MRN: [id number]  Encounter: [id number]    Assessment and Plan  Problem List Items Addressed This Visit        Endocrine    Thyroid nodule - Primary     Thyroid nodule clinically feels stable at 3 cm  She has no symptoms  Will do a follow-up ultrasound in September which would be about a year from the previous 1  We will see her back in January for follow-up  Everything appears stable         Relevant Orders    US thyroid          Chief Complaint:  Britta Thompson is a 62 y o  female who presents for Thyroid Problem (Yearly thyroid check )    Subjective  62-year-old female returns for thyroid evaluation  She had Afirma test last fall which was benign  She has noted no new complaints or problems  No swallowing difficulties, no changes in her voice, no increased size of mass no other symptomatology    Past Medical History:   Diagnosis Date    Arthritis     Disease of thyroid gland     Nodule    No known problems     Raynaud's disease        Past Surgical History:   Procedure Laterality Date    TOOTH EXTRACTION      US GUIDED THYROID BIOPSY  9/23/2021       Family History   Problem Relation Age of Onset    Skin cancer Mother    Jennifer Nine Breast cancer Mother     Cancer Mother         Breast & Melanoma    Vision loss Mother     Kidney cancer Father     Lung cancer Father     Cancer Father         Lung    Uterine cancer Maternal Grandmother     Arthritis Maternal Grandmother     Cancer Maternal Grandmother         Uterine    Breast cancer Other     No Known Problems Brother     Cancer Maternal Aunt         Breast    Cancer Paternal Aunt         Kidney    Cancer Paternal Aunt         Gynological       Social History     Tobacco Use    Smoking status: Never Smoker    Smokeless tobacco: Never Used   Substance Use Topics    Alcohol use:  Yes     Alcohol/week: 7 0 standard drinks     Types: 3 Glasses of wine, 3 Cans of beer, 1 Standard drinks or equivalent per week Comment: social    Drug use: Never        Medications  Current Outpatient Medications on File Prior to Visit   Medication Sig Dispense Refill    FLUoxetine (PROzac) 20 mg capsule Take 1 capsule (20 mg total) by mouth daily 90 capsule 1    NIFEdipine (PROCARDIA XL) 30 mg 24 hr tablet Take 2 tablets (60 mg total) by mouth daily 180 tablet 1    hydroxychloroquine (Plaquenil) 200 mg tablet Take 1 tablet (200 mg total) by mouth daily 90 tablet 1     No current facility-administered medications on file prior to visit  Allergies  Allergies   Allergen Reactions    Codeine GI Intolerance    Tetracyclines & Related        Review of Systems    Objective  Vitals:    01/25/22 0910   Temp: (!) 97 3 °F (36 3 °C)       Physical Exam   Neck:  Right thyroid nodule about 3 cm in size no other nodules appreciated  There are no nodes anywhere in the neck

## 2022-01-25 NOTE — ASSESSMENT & PLAN NOTE
Thyroid nodule clinically feels stable at 3 cm  She has no symptoms  Will do a follow-up ultrasound in September which would be about a year from the previous 1  We will see her back in January for follow-up    Everything appears stable

## 2022-05-10 DIAGNOSIS — I73.00 RAYNAUD'S SYNDROME WITHOUT GANGRENE: ICD-10-CM

## 2022-05-11 RX ORDER — NIFEDIPINE 30 MG/1
60 TABLET, EXTENDED RELEASE ORAL DAILY
Qty: 180 TABLET | Refills: 0 | Status: SHIPPED | OUTPATIENT
Start: 2022-05-11

## 2022-05-11 RX ORDER — FLUOXETINE HYDROCHLORIDE 20 MG/1
20 CAPSULE ORAL DAILY
Qty: 90 CAPSULE | Refills: 0 | Status: SHIPPED | OUTPATIENT
Start: 2022-05-11

## 2022-06-07 ENCOUNTER — OFFICE VISIT (OUTPATIENT)
Dept: FAMILY MEDICINE CLINIC | Facility: MEDICAL CENTER | Age: 62
End: 2022-06-07
Payer: COMMERCIAL

## 2022-06-07 ENCOUNTER — APPOINTMENT (OUTPATIENT)
Dept: LAB | Facility: MEDICAL CENTER | Age: 62
End: 2022-06-07
Payer: COMMERCIAL

## 2022-06-07 VITALS
RESPIRATION RATE: 16 BRPM | HEIGHT: 60 IN | HEART RATE: 64 BPM | BODY MASS INDEX: 22.19 KG/M2 | SYSTOLIC BLOOD PRESSURE: 106 MMHG | WEIGHT: 113 LBS | DIASTOLIC BLOOD PRESSURE: 60 MMHG

## 2022-06-07 DIAGNOSIS — Z13.29 SCREENING FOR THYROID DISORDER: ICD-10-CM

## 2022-06-07 DIAGNOSIS — Z00.00 ANNUAL PHYSICAL EXAM: ICD-10-CM

## 2022-06-07 DIAGNOSIS — Z13.1 SCREENING FOR DIABETES MELLITUS: ICD-10-CM

## 2022-06-07 DIAGNOSIS — Z13.0 SCREENING FOR IRON DEFICIENCY ANEMIA: ICD-10-CM

## 2022-06-07 DIAGNOSIS — R60.0 LOCALIZED EDEMA: Primary | ICD-10-CM

## 2022-06-07 LAB
ANION GAP SERPL CALCULATED.3IONS-SCNC: 1 MMOL/L (ref 4–13)
BASOPHILS # BLD AUTO: 0.05 THOUSANDS/ΜL (ref 0–0.1)
BASOPHILS NFR BLD AUTO: 1 % (ref 0–1)
BUN SERPL-MCNC: 18 MG/DL (ref 5–25)
CALCIUM SERPL-MCNC: 10 MG/DL (ref 8.3–10.1)
CHLORIDE SERPL-SCNC: 107 MMOL/L (ref 100–108)
CO2 SERPL-SCNC: 30 MMOL/L (ref 21–32)
CREAT SERPL-MCNC: 0.79 MG/DL (ref 0.6–1.3)
EOSINOPHIL # BLD AUTO: 0.19 THOUSAND/ΜL (ref 0–0.61)
EOSINOPHIL NFR BLD AUTO: 3 % (ref 0–6)
ERYTHROCYTE [DISTWIDTH] IN BLOOD BY AUTOMATED COUNT: 13.1 % (ref 11.6–15.1)
GFR SERPL CREATININE-BSD FRML MDRD: 81 ML/MIN/1.73SQ M
GLUCOSE P FAST SERPL-MCNC: 95 MG/DL (ref 65–99)
HCT VFR BLD AUTO: 44.5 % (ref 34.8–46.1)
HGB BLD-MCNC: 14.2 G/DL (ref 11.5–15.4)
IMM GRANULOCYTES # BLD AUTO: 0.02 THOUSAND/UL (ref 0–0.2)
IMM GRANULOCYTES NFR BLD AUTO: 0 % (ref 0–2)
LYMPHOCYTES # BLD AUTO: 1.48 THOUSANDS/ΜL (ref 0.6–4.47)
LYMPHOCYTES NFR BLD AUTO: 26 % (ref 14–44)
MCH RBC QN AUTO: 29.8 PG (ref 26.8–34.3)
MCHC RBC AUTO-ENTMCNC: 31.9 G/DL (ref 31.4–37.4)
MCV RBC AUTO: 94 FL (ref 82–98)
MONOCYTES # BLD AUTO: 0.55 THOUSAND/ΜL (ref 0.17–1.22)
MONOCYTES NFR BLD AUTO: 10 % (ref 4–12)
NEUTROPHILS # BLD AUTO: 3.51 THOUSANDS/ΜL (ref 1.85–7.62)
NEUTS SEG NFR BLD AUTO: 60 % (ref 43–75)
NRBC BLD AUTO-RTO: 0 /100 WBCS
PLATELET # BLD AUTO: 323 THOUSANDS/UL (ref 149–390)
PMV BLD AUTO: 10.7 FL (ref 8.9–12.7)
POTASSIUM SERPL-SCNC: 4.7 MMOL/L (ref 3.5–5.3)
RBC # BLD AUTO: 4.76 MILLION/UL (ref 3.81–5.12)
SODIUM SERPL-SCNC: 138 MMOL/L (ref 136–145)
TSH SERPL DL<=0.05 MIU/L-ACNC: 1.13 UIU/ML (ref 0.45–4.5)
WBC # BLD AUTO: 5.8 THOUSAND/UL (ref 4.31–10.16)

## 2022-06-07 PROCEDURE — 85025 COMPLETE CBC W/AUTO DIFF WBC: CPT

## 2022-06-07 PROCEDURE — 80048 BASIC METABOLIC PNL TOTAL CA: CPT

## 2022-06-07 PROCEDURE — 3008F BODY MASS INDEX DOCD: CPT | Performed by: FAMILY MEDICINE

## 2022-06-07 PROCEDURE — 99396 PREV VISIT EST AGE 40-64: CPT | Performed by: FAMILY MEDICINE

## 2022-06-07 PROCEDURE — 3725F SCREEN DEPRESSION PERFORMED: CPT | Performed by: FAMILY MEDICINE

## 2022-06-07 PROCEDURE — 1036F TOBACCO NON-USER: CPT | Performed by: FAMILY MEDICINE

## 2022-06-07 PROCEDURE — 84443 ASSAY THYROID STIM HORMONE: CPT

## 2022-06-07 RX ORDER — CLINDAMYCIN PHOSPHATE 10 UG/ML
LOTION TOPICAL
COMMUNITY
Start: 2022-05-27

## 2022-06-07 RX ORDER — TRIAMTERENE AND HYDROCHLOROTHIAZIDE 37.5; 25 MG/1; MG/1
1 TABLET ORAL DAILY
Qty: 5 TABLET | Refills: 0 | Status: SHIPPED | OUTPATIENT
Start: 2022-06-07

## 2022-06-10 NOTE — PROGRESS NOTES
ADULT ANNUAL [address]  [facility]    NAME: Viki Lanes  AGE: 62 y o  SEX: female  : 1960     DATE: 6/10/2022     Assessment and Plan:     Problem List Items Addressed This Visit        Other    Localized edema - Primary     She asks me if I can cut her rings off  She has some swelling in both hands  I told her I could, however while as that she try a short course of a diuretic and then try again with soapy water  She will call if she has problems with that  Relevant Medications    triamterene-hydrochlorothiazide (MAXZIDE-25) 37 5-25 mg per tablet      Other Visit Diagnoses     Screening for diabetes mellitus        Relevant Orders    Basic metabolic panel (Completed)    Screening for thyroid disorder        Relevant Orders    TSH, 3rd generation with Free T4 reflex (Completed)    Screening for iron deficiency anemia        Relevant Orders    CBC and differential (Completed)          Immunizations and preventive care screenings were discussed with patient today  Appropriate education was printed on patient's after visit summary  Counseling:  Alcohol/drug use: discussed moderation in alcohol intake, the recommendations for healthy alcohol use, and avoidance of illicit drug use  · Exercise: the importance of regular exercise/physical activity was discussed  Recommend exercise 3-5 times per week for at least 30 minutes  Depression Screening and Follow-up Plan: Patient was screened for depression during today's encounter  They screened negative with a PHQ-2 score of 0  No follow-ups on file  Chief Complaint:     Chief Complaint   Patient presents with    Annual Exam      History of Present Illness:     Adult Annual Physical   Patient here for a comprehensive physical exam  The patient reports no problems  Diet and Physical Activity  · Diet/Nutrition: well balanced diet     · Exercise: moderate cardiovascular exercise  Depression Screening  PHQ-2/9 Depression Screening    Little interest or pleasure in doing things: 0 - not at all  Feeling down, depressed, or hopeless: 0 - not at all  PHQ-2 Score: 0  PHQ-2 Interpretation: Negative depression screen       General Health  · Sleep: sleeps well  · Hearing: normal - bilateral   · Vision: no vision problems  · Dental: regular dental visits  /GYN Health  · Patient is: postmenopausal  · Last menstrual period:    · Contraceptive method:   Robles Pineda Review of Systems:     Review of Systems   Constitutional: Negative for chills and fever  HENT: Negative for ear pain and sore throat  Eyes: Negative for pain and visual disturbance  Respiratory: Negative for cough and shortness of breath  Cardiovascular: Negative for chest pain and palpitations  Gastrointestinal: Negative for abdominal pain and vomiting  Genitourinary: Negative for dysuria and hematuria  Musculoskeletal: Negative for arthralgias and back pain  Skin: Negative for color change and rash  Neurological: Negative for seizures and syncope  All other systems reviewed and are negative  Past Medical History:     Past Medical History:   Diagnosis Date    Arthritis     Disease of thyroid gland     Nodule    No known problems     Raynaud's disease       Past Surgical History:     Past Surgical History:   Procedure Laterality Date    TOOTH EXTRACTION      US GUIDED THYROID BIOPSY  9/23/2021      Social History:     Social History     Socioeconomic History    Marital status: /Civil Union     Spouse name: None    Number of children: None    Years of education: None    Highest education level: None   Occupational History    Occupation: Full-Time Employment   Tobacco Use    Smoking status: Never Smoker    Smokeless tobacco: Never Used   Substance and Sexual Activity    Alcohol use:  Yes     Alcohol/week: 7 0 standard drinks     Types: 3 Glasses of wine, 3 Cans of beer, 1 Standard drinks or equivalent per week     Comment: social    Drug use: Never    Sexual activity: Yes     Partners: Male     Birth control/protection: Post-menopausal, Male Sterilization, Other   Other Topics Concern    None   Social History Narrative    Always uses seat belt     Social Determinants of Health     Financial Resource Strain: Not on file   Food Insecurity: Not on file   Transportation Needs: Not on file   Physical Activity: Not on file   Stress: Not on file   Social Connections: Not on file   Intimate Partner Violence: Not on file   Housing Stability: Not on file      Family History:     Family History   Problem Relation Age of Onset    Skin cancer Mother     Breast cancer Mother     Cancer Mother         Breast & Melanoma    Vision loss Mother     Kidney cancer Father     Lung cancer Father     Cancer Father         Lung    No Known Problems Brother     No Known Problems Son     No Known Problems Son     Uterine cancer Maternal Grandmother     Arthritis Maternal Grandmother     Cancer Maternal Grandmother         Uterine    Cancer Maternal Aunt         Breast    Cancer Paternal Aunt         Kidney    Cancer Paternal Aunt         Gynological    Breast cancer Other       Current Medications:     Current Outpatient Medications   Medication Sig Dispense Refill    clindamycin (CLEOCIN T) 1 % lotion APPLY TO RASH ONCE DAILY   FLUoxetine (PROzac) 20 mg capsule Take 1 capsule (20 mg total) by mouth in the morning  90 capsule 0    hydroxychloroquine (Plaquenil) 200 mg tablet Take 1 tablet (200 mg total) by mouth daily 90 tablet 1    NIFEdipine (PROCARDIA XL) 30 mg 24 hr tablet Take 2 tablets (60 mg total) by mouth in the morning  180 tablet 0    triamterene-hydrochlorothiazide (MAXZIDE-25) 37 5-25 mg per tablet Take 1 tablet by mouth daily 5 tablet 0     No current facility-administered medications for this visit  Allergies:      Allergies   Allergen Reactions    Codeine GI Intolerance    Tetracyclines & Related       Physical Exam:     /60 (BP Location: Left arm, Patient Position: Sitting, Cuff Size: Adult)   Pulse 64   Resp 16   Ht 5' (1 524 m)   Wt 51 3 kg (113 lb)   BMI 22 07 kg/m²     Physical Exam  Vitals and nursing note reviewed  Constitutional:       Appearance: Normal appearance  She is well-developed  HENT:      Head: Normocephalic  Right Ear: Tympanic membrane and ear canal normal       Left Ear: Tympanic membrane and ear canal normal       Nose: Nose normal  No mucosal edema or rhinorrhea  Mouth/Throat:      Pharynx: Uvula midline  No oropharyngeal exudate  Tonsils: No tonsillar exudate  Eyes:      General: Lids are normal       Conjunctiva/sclera: Conjunctivae normal       Pupils: Pupils are equal, round, and reactive to light  Neck:      Thyroid: No thyromegaly  Vascular: No carotid bruit  Trachea: Trachea normal    Cardiovascular:      Rate and Rhythm: Normal rate and regular rhythm  Pulses: Normal pulses  Heart sounds: Normal heart sounds, S1 normal and S2 normal  No murmur heard  Pulmonary:      Effort: Pulmonary effort is normal  No respiratory distress  Breath sounds: Normal breath sounds  No wheezing, rhonchi or rales  Abdominal:      General: Bowel sounds are normal       Palpations: Abdomen is soft  Tenderness: There is no abdominal tenderness  Hernia: No hernia is present  Musculoskeletal:         General: Normal range of motion  Cervical back: Normal range of motion  Lymphadenopathy:      Cervical: No cervical adenopathy  Skin:     General: Skin is warm and dry  Findings: No rash  Neurological:      General: No focal deficit present  Mental Status: She is alert and oriented to person, place, and time  Cranial Nerves: No cranial nerve deficit  Sensory: No sensory deficit        Coordination: Coordination normal       Deep Tendon Reflexes: Reflexes are normal and symmetric  Psychiatric:         Speech: Speech normal          Behavior: Behavior normal  Behavior is cooperative            Dameon Garcia MD  [id number] [facility]

## 2022-06-10 NOTE — PATIENT INSTRUCTIONS

## 2022-07-29 DIAGNOSIS — M34.9 LIMITED SCLERODERMA (HCC): ICD-10-CM

## 2022-07-29 DIAGNOSIS — I73.00 RAYNAUD'S SYNDROME WITHOUT GANGRENE: ICD-10-CM

## 2022-07-29 DIAGNOSIS — M25.50 POLYARTHRALGIA: ICD-10-CM

## 2022-07-29 RX ORDER — HYDROXYCHLOROQUINE SULFATE 200 MG/1
200 TABLET, FILM COATED ORAL DAILY
Qty: 90 TABLET | Refills: 0 | Status: SHIPPED | OUTPATIENT
Start: 2022-07-29 | End: 2022-10-27

## 2022-09-07 DIAGNOSIS — I73.00 RAYNAUD'S SYNDROME WITHOUT GANGRENE: ICD-10-CM

## 2022-09-07 RX ORDER — FLUOXETINE HYDROCHLORIDE 20 MG/1
20 CAPSULE ORAL DAILY
Qty: 90 CAPSULE | Refills: 1 | Status: SHIPPED | OUTPATIENT
Start: 2022-09-07

## 2022-09-15 ENCOUNTER — TELEPHONE (OUTPATIENT)
Dept: ADMINISTRATIVE | Facility: OTHER | Age: 62
End: 2022-09-15

## 2022-09-15 NOTE — TELEPHONE ENCOUNTER
----- Message from Atilio Quevedo sent at 9/14/2022 11:31 AM EDT -----  Regarding: mammo  09/14/22 11:31 AM    Hello, our patient Gino Jenkins has had Mammogram completed/performed  Please assist in updating the patient chart by pulling the Care Everywhere (CE) document  The date of service is 8/2021       Thank you,  Atilio Cancer  [facility]

## 2022-11-14 NOTE — PROGRESS NOTES
Assessment and Plan:   Patient is a 56-year-old female who presents for rheumatology follow-up for limited scleroderma and osteopenia  In terms of her scleroderma she does have positive Scl 70 antibody which is typically associated with diffuse scleroderma but she has not had diffuse skin involvement thus far and fortunately no signs of lung involvement thus far  Today she reports that she feels the skin around her mouth might be tightening more  Her other skin has been stable and does appear stable on exam   She does have significant Raynaud's which is present today in the office and she will plan to increase her nifedipine to 60 mg daily  She has no new concerns regarding respiratory complaints today  We will get updated PFTs for screening as she is at higher risk for developing interstitial lung disease  I also referred her to get a DEXA scan done  She can otherwise follow-up in 1 year  Plan:  Diagnoses and all orders for this visit:    Limited scleroderma ([address] )  -     Complete PFT with post bronchodilator; Future    Raynaud's syndrome without gangrene    Long-term use of Plaquenil    Post-menopause  -     DXA bone density spine hip and pelvis; Future    Osteopenia of left hip  -     DXA bone density spine hip and pelvis; Future    MARLEY (dyspnea on exertion)  -     Complete PFT with post bronchodilator; Future        Follow-up plan: 1 year        Rheumatic Disease Summary  1   Scleroderma (+SCL-70)   -Est with me 9/11/20 for scleroderma, previously followed with Dr Dafne Booker  -Dx around 2015, features of Raynaud’s, sclerodactyly up to PIPs B/L, facial tightness with furrowing around mouth, no telangiectasias, GI issues, or evidence of PAH or ILD; also has arthralgias in hands with persistent puffiness in fingers   -Also tried sildenafil at some point but was too expensive   -Reports having normal CT chest in past  -PFTs 10/21/19: normal, no obst/rest lung disease, normal DLCO  -Presented to me on plaquenil which has helped hand arthralgias; also taking nifedipine and fluoxetine for raynaud’s; obtain labs, cont current meds  -Labs 9/28/20: +ERIK 160 nucleolar and homog , +SCL-70 >8, neg centromere, dsDNA, bassett, RNP, SSA, SSB, Tiffany-1, RF, CCP, SPEP, hep panel, low C3 85, normal C4, ESR 6, CRP<3  -Visit 12/11/20: repeat PFTs 10/2021, obtain baseline CXR, increased nifedipine to 60mg daily   -PFTs 8/26/21: normal DLCO, spirometry and lung volumes  -CXR 8/26/21: normal   -Visit 11/15/21: grossly stable without major changes, plan to repeat PFTs after next visit given completely normal this year and no resp sx; will try the nifedipine 60mg qhs since it makes her drowsy   -Visit 11/15/22: skin stable, no new pulm concerns, get updated PFTs for screening, increase nifedipine to 60mg for winter   2  Osteopenia   -DEXA 2017: T -2 lumbar, FRAX 0 4/6% for hip/major fracture  -No tx indicated thus far   -DEXA 11/4/20: T -2 2 lumbar, decreased BMD in hip and lumbar, FRAX 1/7% for hip/major OP fracture   -DEXA due 11/2022   3  Comorbidities: lung nodules, h/o latent TB s/p tx as a child, thyroid nodule     HPI  Sherrill Marshall is a 65 y o   female who presents for rheumatology follow-up for scleroderma and osteopenia  She is starting to get increased raynaud's  She takes 30mg of nifedipine for part of the year when it's warmer, and increases to 60mg in fall and winter, she is planning to do this soon  She feels like the skin surrounding her mouth is becoming titer, notices this when she opens her mouth wide  Her other skin on her fingers and toes seems stable and unchanged to her  denies any new dyspnea or cough  We reviewed that she is due for a DEXA scan at this time      The following portions of the patient's history were reviewed and updated as appropriate: allergies, current medications, past family history, past medical history, past social history, past surgical history and problem list     Review of Systems:   Review of Systems   Respiratory: Negative for cough and shortness of breath  Cardiovascular: Negative for chest pain  Musculoskeletal: Negative for arthralgias and joint swelling  Skin: Positive for color change  Negative for rash  Home Medications:    Current Outpatient Medications:   •  clindamycin (CLEOCIN T) 1 % lotion, APPLY TO RASH ONCE DAILY  , Disp: , Rfl:   •  FLUoxetine (PROzac) 20 mg capsule, Take 1 capsule (20 mg total) by mouth daily, Disp: 90 capsule, Rfl: 1  •  hydroxychloroquine (Plaquenil) 200 mg tablet, Take 1 tablet (200 mg total) by mouth daily, Disp: 90 tablet, Rfl: 1  •  NIFEdipine (PROCARDIA XL) 30 mg 24 hr tablet, Take 2 tablets (60 mg total) by mouth in the morning , Disp: 180 tablet, Rfl: 0    Objective:    Vitals:    11/15/22 0925   BP: 126/78   Weight: 50 7 kg (111 lb 12 8 oz)   Height: 5' (1 524 m)       Physical Exam  Constitutional:       General: She is not in acute distress  HENT:      Head: Normocephalic and atraumatic  Eyes:      Conjunctiva/sclera: Conjunctivae normal    Pulmonary:      Effort: Pulmonary effort is normal  No respiratory distress  Musculoskeletal:      Cervical back: Neck supple  Skin:     Coloration: Skin is not pale  Comments: Active raynaud's in office affecting fingers 2-3 B/L; skin appears stable, sclerodactyly stable from fingertips extending to PIPs; taut skin around mouth   Neurological:      Mental Status: She is alert  Mental status is at baseline  Psychiatric:         Mood and Affect: Mood normal          Behavior: Behavior normal          Imaging:   CXR 8/26/21:  Normal lungs       PFTS 8/26/21  Results:  Patient gave good effort and cooperation  The testing met ATS Standards for Acceptability and Repeatability  Baseline oxygen saturation was 99% on room air with a heart rate of 67      Spirometry:  FEV1/FVC Ratio is 76  FEV1 is 2 16L which is 95% predicted  FVC is 2 86L which is 100% predicted    There is no significant postbronchodilator improvement in FEV1 or FVC      Flow volume loop:   normal     Lung volumes: Total lung capacity is 4 53L which is 98% predicted    Residual volume is 95% predicted      Diffusing capacity:  91% predicted        IMPRESSION:  Normal spirometry, lung volumes, diffusion capacity without bronchodilator responsiveness      Component      Latest Ref Rng & Units 6/7/2022   WBC      4 31 - 10 16 Thousand/uL 5 80   Red Blood Cell Count      3 81 - 5 12 Million/uL 4 76   Hemoglobin      11 5 - 15 4 g/dL 14 2   HCT      34 8 - 46 1 % 44 5   MCV      82 - 98 fL 94   MCH      26 8 - 34 3 pg 29 8   MCHC      31 4 - 37 4 g/dL 31 9   RDW      11 6 - 15 1 % 13 1   MPV      8 9 - 12 7 fL 10 7   Platelet Count      522 - 390 Thousands/uL 323   nRBC      /100 WBCs 0   Neutrophils %      43 - 75 % 60   Immat GRANS %      0 - 2 % 0   Lymphocytes Relative      14 - 44 % 26   Monocytes Relative      4 - 12 % 10   Eosinophils      0 - 6 % 3   Basophils Relative      0 - 1 % 1   Absolute Neutrophils      1 85 - 7 62 Thousands/µL 3 51   Immature Grans Absolute      0 00 - 0 20 Thousand/uL 0 02   Lymphocytes Absolute      0 60 - 4 47 Thousands/µL 1 48   Absolute Monocytes      0 17 - 1 22 Thousand/µL 0 55   Absolute Eosinophils      0 00 - 0 61 Thousand/µL 0 19   Basophils Absolute      0 00 - 0 10 Thousands/µL 0 05   Sodium      136 - 145 mmol/L 138   Potassium      3 5 - 5 3 mmol/L 4 7   Chloride      100 - 108 mmol/L 107   CO2      21 - 32 mmol/L 30   Anion Gap      4 - 13 mmol/L 1 (L)   BUN      5 - 25 mg/dL 18   Creatinine      0 60 - 1 30 mg/dL 0 79   GLUCOSE FASTING      65 - 99 mg/dL 95   Calcium      8 3 - 10 1 mg/dL 10 0   eGFR      ml/min/1 73sq m 81   TSH 3RD GENERATON      0 450 - 4 500 uIU/mL 1 130

## 2022-11-15 ENCOUNTER — OFFICE VISIT (OUTPATIENT)
Dept: RHEUMATOLOGY | Facility: CLINIC | Age: 62
End: 2022-11-15

## 2022-11-15 VITALS
HEIGHT: 60 IN | BODY MASS INDEX: 21.95 KG/M2 | DIASTOLIC BLOOD PRESSURE: 78 MMHG | SYSTOLIC BLOOD PRESSURE: 126 MMHG | WEIGHT: 111.8 LBS

## 2022-11-15 DIAGNOSIS — I73.00 RAYNAUD'S SYNDROME WITHOUT GANGRENE: ICD-10-CM

## 2022-11-15 DIAGNOSIS — M34.9 LIMITED SCLERODERMA (HCC): Primary | ICD-10-CM

## 2022-11-15 DIAGNOSIS — R06.09 DOE (DYSPNEA ON EXERTION): ICD-10-CM

## 2022-11-15 DIAGNOSIS — M85.852 OSTEOPENIA OF LEFT HIP: ICD-10-CM

## 2022-11-15 DIAGNOSIS — Z79.899 LONG-TERM USE OF PLAQUENIL: ICD-10-CM

## 2022-11-15 DIAGNOSIS — Z78.0 POST-MENOPAUSE: ICD-10-CM

## 2022-11-30 DIAGNOSIS — I73.00 RAYNAUD'S SYNDROME WITHOUT GANGRENE: ICD-10-CM

## 2022-11-30 RX ORDER — NIFEDIPINE 30 MG/1
60 TABLET, EXTENDED RELEASE ORAL DAILY
Qty: 180 TABLET | Refills: 0 | Status: SHIPPED | OUTPATIENT
Start: 2022-11-30

## 2022-12-06 ENCOUNTER — HOSPITAL ENCOUNTER (OUTPATIENT)
Dept: BONE DENSITY | Facility: CLINIC | Age: 62
Discharge: HOME | End: 2022-12-06

## 2022-12-06 VITALS — BODY MASS INDEX: 20.78 KG/M2 | HEIGHT: 62 IN

## 2022-12-06 DIAGNOSIS — M85.852 OSTEOPENIA OF LEFT HIP: ICD-10-CM

## 2022-12-06 DIAGNOSIS — Z78.0 POST-MENOPAUSE: ICD-10-CM

## 2023-01-04 DIAGNOSIS — I73.00 RAYNAUD'S SYNDROME WITHOUT GANGRENE: ICD-10-CM

## 2023-01-04 RX ORDER — FLUOXETINE HYDROCHLORIDE 20 MG/1
20 CAPSULE ORAL DAILY
Qty: 90 CAPSULE | Refills: 1 | Status: SHIPPED | OUTPATIENT
Start: 2023-01-04 | End: 2023-01-09 | Stop reason: SDUPTHER

## 2023-01-05 ENCOUNTER — TELEPHONE (OUTPATIENT)
Dept: OBGYN CLINIC | Facility: HOSPITAL | Age: 63
End: 2023-01-05

## 2023-01-05 NOTE — TELEPHONE ENCOUNTER
Pt contacted Call Center requested refill of their medication  Medication Name: Plaquenil      Dosage of Med: 200 mg      Frequency of Med: once a day      Remaining Medication: 30       Pharmacy and Location: [facility] [address] [place] [address]   Phone:  [phone]  Fax:  [phone]         Pt  Preferred Callback Phone Number: [phone]      Thank you

## 2023-01-05 NOTE — TELEPHONE ENCOUNTER
Pt contacted Call Center requested refill of their medication  Medication Name: Prozac      Dosage of Med: 20mg      Frequency of Med: once daily       Remaining Medication: 7      Pharmacy and Location: [facility]   [facility] [address]   Phone:  [phone]  Fax:  [phone]         Pt  Preferred Callback Phone Number: [phone]      Thank you

## 2023-01-09 ENCOUNTER — HOSPITAL ENCOUNTER (OUTPATIENT)
Dept: ULTRASOUND IMAGING | Facility: HOSPITAL | Age: 63
Discharge: HOME | End: 2023-01-09
Attending: SURGERY

## 2023-01-09 DIAGNOSIS — I73.00 RAYNAUD'S SYNDROME WITHOUT GANGRENE: ICD-10-CM

## 2023-01-09 DIAGNOSIS — E04.1 THYROID NODULE: ICD-10-CM

## 2023-01-09 RX ORDER — FLUOXETINE HYDROCHLORIDE 20 MG/1
20 CAPSULE ORAL DAILY
Qty: 90 CAPSULE | Refills: 1 | Status: SHIPPED | OUTPATIENT
Start: 2023-01-09 | End: 2023-05-31 | Stop reason: SDUPTHER

## 2023-01-25 ENCOUNTER — OFFICE VISIT (OUTPATIENT)
Dept: SURGERY | Facility: CLINIC | Age: 63
End: 2023-01-25

## 2023-01-25 VITALS
HEIGHT: 61 IN | BODY MASS INDEX: 20.96 KG/M2 | DIASTOLIC BLOOD PRESSURE: 66 MMHG | WEIGHT: 111 LBS | SYSTOLIC BLOOD PRESSURE: 114 MMHG | HEART RATE: 59 BPM

## 2023-01-25 DIAGNOSIS — E04.1 THYROID NODULE: Primary | ICD-10-CM

## 2023-01-25 NOTE — PROGRESS NOTES
Office Visit - General Surgery  Jerica Azevedo MRN: [id number]  Encounter: [id number]    Assessment and Plan  Problem List Items Addressed This Visit        Endocrine    Thyroid nodule - Primary     Clinically and radiographically nodule appears stable  I would see her back in a years time and an ultrasound in 2 years time  Chief Complaint:  Jerica Azevedo is a 65 y o  female who presents for Follow-up    Subjective  79-year-old female known right thyroid nodule which has been stable for a number of years  She has no hypo or hyperthyroid symptoms  No compressive symptoms  Recent ultrasound was done which was stable  Past Medical History:   Diagnosis Date   • Arthritis    • Disease of thyroid gland     Nodule   • No known problems    • Raynaud's disease        Past Surgical History:   Procedure Laterality Date   • TOOTH EXTRACTION     • US GUIDED THYROID BIOPSY  9/23/2021       Family History   Problem Relation Age of Onset   • Skin cancer Mother    • Breast cancer Mother    • Cancer Mother         Breast & Melanoma   • Vision loss Mother    • Kidney cancer Father    • Lung cancer Father    • Cancer Father         Lung   • No Known Problems Brother    • No Known Problems Son    • No Known Problems Son    • Uterine cancer Maternal Grandmother    • Arthritis Maternal Grandmother    • Cancer Maternal Grandmother         Uterine   • Cancer Maternal Aunt         Breast   • Cancer Paternal Aunt         Kidney   • Cancer Paternal Aunt         Gynological   • Breast cancer Other        Social History     Tobacco Use   • Smoking status: Never   • Smokeless tobacco: Never   Substance Use Topics   • Alcohol use:  Yes     Alcohol/week: 7 0 standard drinks     Types: 3 Glasses of wine, 3 Cans of beer, 1 Standard drinks or equivalent per week     Comment: social   • Drug use: Never        Medications  Current Outpatient Medications on File Prior to Visit   Medication Sig Dispense Refill   • clindamycin (CLEOCIN T) 1 % lotion APPLY TO RASH ONCE DAILY  • FLUoxetine (PROzac) 20 mg capsule Take 1 capsule (20 mg total) by mouth daily 90 capsule 1   • hydroxychloroquine (Plaquenil) 200 mg tablet Take 1 tablet (200 mg total) by mouth daily 90 tablet 1   • NIFEdipine (PROCARDIA XL) 30 mg 24 hr tablet Take 2 tablets (60 mg total) by mouth daily 180 tablet 0     No current facility-administered medications on file prior to visit  Allergies  Allergies   Allergen Reactions   • Codeine GI Intolerance   • Tetracyclines & Related        Review of Systems   Constitutional: Negative for chills and fever  HENT: Negative for ear pain and sore throat  Eyes: Negative for pain and visual disturbance  Respiratory: Negative for cough and shortness of breath  Cardiovascular: Negative for chest pain and palpitations  Gastrointestinal: Negative for abdominal pain and vomiting  Genitourinary: Negative for dysuria and hematuria  Musculoskeletal: Negative for arthralgias and back pain  Skin: Negative for color change and rash  Neurological: Negative for seizures and syncope  All other systems reviewed and are negative  Objective  Vitals:    01/25/23 0857   BP: 114/66   Pulse: 59       Physical Exam  Vitals and nursing note reviewed  Constitutional:       General: She is not in acute distress  Appearance: She is well-developed  She is not diaphoretic  HENT:      Head: Normocephalic and atraumatic  Right Ear: External ear normal       Left Ear: External ear normal    Eyes:      General: No scleral icterus  Conjunctiva/sclera: Conjunctivae normal    Neck:      Thyroid: No thyromegaly  Trachea: No tracheal deviation  Comments: Right thyroid about 3 cm nodule smooth, firm  No nodes anywhere appreciated  Cardiovascular:      Rate and Rhythm: Normal rate and regular rhythm  Heart sounds: Normal heart sounds  No murmur heard  No friction rub     Pulmonary:      Effort: Pulmonary effort is normal  No respiratory distress  Breath sounds: Normal breath sounds  No wheezing or rales  Abdominal:      General: There is no distension  Palpations: Abdomen is soft  There is no mass  Tenderness: There is no abdominal tenderness  There is no guarding or rebound  Musculoskeletal:         General: Normal range of motion  Cervical back: Neck supple  Lymphadenopathy:      Cervical: No cervical adenopathy  Skin:     General: Skin is warm and dry  Neurological:      Mental Status: She is alert and oriented to person, place, and time  Psychiatric:         Behavior: Behavior normal          Thought Content:  Thought content normal          Judgment: Judgment normal

## 2023-01-25 NOTE — ASSESSMENT & PLAN NOTE
Clinically and radiographically nodule appears stable  I would see her back in a years time and an ultrasound in 2 years time

## 2023-01-27 ENCOUNTER — HOSPITAL ENCOUNTER (OUTPATIENT)
Dept: PULMONOLOGY | Facility: HOSPITAL | Age: 63
End: 2023-01-27

## 2023-01-27 DIAGNOSIS — M34.9 LIMITED SCLERODERMA (HCC): ICD-10-CM

## 2023-01-27 DIAGNOSIS — R06.09 DOE (DYSPNEA ON EXERTION): ICD-10-CM

## 2023-01-27 RX ORDER — ALBUTEROL SULFATE 2.5 MG/3ML
2.5 SOLUTION RESPIRATORY (INHALATION) ONCE
Status: COMPLETED | OUTPATIENT
Start: 2023-01-27 | End: 2023-01-27

## 2023-01-27 RX ADMIN — ALBUTEROL SULFATE 2.5 MG: 2.5 SOLUTION RESPIRATORY (INHALATION) at 09:11

## 2023-02-28 ENCOUNTER — TELEPHONE (OUTPATIENT)
Dept: OBGYN CLINIC | Facility: MEDICAL CENTER | Age: 63
End: 2023-02-28

## 2023-02-28 DIAGNOSIS — M34.9 LIMITED SCLERODERMA (HCC): ICD-10-CM

## 2023-02-28 DIAGNOSIS — I73.00 RAYNAUD'S SYNDROME WITHOUT GANGRENE: ICD-10-CM

## 2023-02-28 DIAGNOSIS — M25.50 POLYARTHRALGIA: ICD-10-CM

## 2023-02-28 RX ORDER — HYDROXYCHLOROQUINE SULFATE 200 MG/1
200 TABLET, FILM COATED ORAL DAILY
Qty: 90 TABLET | Refills: 2 | Status: SHIPPED | OUTPATIENT
Start: 2023-02-28 | End: 2023-05-29

## 2023-02-28 NOTE — TELEPHONE ENCOUNTER
Pt contacted Call Center requested refill of their medication  Medication Name:hydroxychloroquine (Plaquenil)           Dosage of Med:  200 mg           Frequency of Med: 1 daily     Remaining Medication: 1 weeks worth      Pharmacy and Location: [facility] [place] [address]   Phone:  [phone]  Fax:  [phone]      90 day supply       Pt  Preferred Callback Phone Number: [phone]      Thank you  PLEASE ADVISE PATIENTS:    REFILL REQUESTS WILL BE PROCESSED WITHIN 24-48 HOURS

## 2023-04-30 DIAGNOSIS — I73.00 RAYNAUD'S SYNDROME WITHOUT GANGRENE: ICD-10-CM

## 2023-05-01 RX ORDER — NIFEDIPINE 30 MG/1
60 TABLET, EXTENDED RELEASE ORAL DAILY
Qty: 180 TABLET | Refills: 0 | Status: SHIPPED | OUTPATIENT
Start: 2023-05-01

## 2023-05-31 DIAGNOSIS — I73.00 RAYNAUD'S SYNDROME WITHOUT GANGRENE: ICD-10-CM

## 2023-05-31 RX ORDER — FLUOXETINE HYDROCHLORIDE 20 MG/1
20 CAPSULE ORAL DAILY
Qty: 90 CAPSULE | Refills: 0 | Status: SHIPPED | OUTPATIENT
Start: 2023-05-31

## 2023-07-17 ENCOUNTER — APPOINTMENT (OUTPATIENT)
Dept: LAB | Facility: MEDICAL CENTER | Age: 63
End: 2023-07-17
Payer: COMMERCIAL

## 2023-07-17 ENCOUNTER — OFFICE VISIT (OUTPATIENT)
Dept: FAMILY MEDICINE CLINIC | Facility: MEDICAL CENTER | Age: 63
End: 2023-07-17
Payer: COMMERCIAL

## 2023-07-17 VITALS
BODY MASS INDEX: 20.39 KG/M2 | DIASTOLIC BLOOD PRESSURE: 76 MMHG | SYSTOLIC BLOOD PRESSURE: 104 MMHG | HEIGHT: 62 IN | HEART RATE: 65 BPM | TEMPERATURE: 98.7 F | RESPIRATION RATE: 16 BRPM | WEIGHT: 110.8 LBS

## 2023-07-17 DIAGNOSIS — Z13.1 SCREENING FOR DIABETES MELLITUS: ICD-10-CM

## 2023-07-17 DIAGNOSIS — Z13.220 SCREENING FOR LIPID DISORDERS: ICD-10-CM

## 2023-07-17 DIAGNOSIS — I73.00 RAYNAUD'S SYNDROME WITHOUT GANGRENE: ICD-10-CM

## 2023-07-17 DIAGNOSIS — Z13.0 SCREENING FOR IRON DEFICIENCY ANEMIA: ICD-10-CM

## 2023-07-17 DIAGNOSIS — Z13.29 SCREENING FOR THYROID DISORDER: ICD-10-CM

## 2023-07-17 DIAGNOSIS — M34.9 LIMITED SCLERODERMA (HCC): ICD-10-CM

## 2023-07-17 DIAGNOSIS — E04.1 THYROID NODULE: ICD-10-CM

## 2023-07-17 DIAGNOSIS — Z00.00 PREVENTATIVE HEALTH CARE: Primary | ICD-10-CM

## 2023-07-17 PROBLEM — R60.0 LOCALIZED EDEMA: Status: RESOLVED | Noted: 2022-06-07 | Resolved: 2023-07-17

## 2023-07-17 LAB
ALBUMIN SERPL BCP-MCNC: 4.3 G/DL (ref 3.5–5)
ALP SERPL-CCNC: 70 U/L (ref 46–116)
ALT SERPL W P-5'-P-CCNC: 21 U/L (ref 12–78)
ANION GAP SERPL CALCULATED.3IONS-SCNC: 3 MMOL/L
AST SERPL W P-5'-P-CCNC: 20 U/L (ref 5–45)
BASOPHILS # BLD AUTO: 0.06 THOUSANDS/ÂΜL (ref 0–0.1)
BASOPHILS NFR BLD AUTO: 1 % (ref 0–1)
BILIRUB SERPL-MCNC: 0.45 MG/DL (ref 0.2–1)
BUN SERPL-MCNC: 15 MG/DL (ref 5–25)
CALCIUM SERPL-MCNC: 9.4 MG/DL (ref 8.3–10.1)
CHLORIDE SERPL-SCNC: 107 MMOL/L (ref 96–108)
CHOLEST SERPL-MCNC: 224 MG/DL
CO2 SERPL-SCNC: 27 MMOL/L (ref 21–32)
CREAT SERPL-MCNC: 0.82 MG/DL (ref 0.6–1.3)
EOSINOPHIL # BLD AUTO: 0.08 THOUSAND/ÂΜL (ref 0–0.61)
EOSINOPHIL NFR BLD AUTO: 2 % (ref 0–6)
ERYTHROCYTE [DISTWIDTH] IN BLOOD BY AUTOMATED COUNT: 12.7 % (ref 11.6–15.1)
GFR SERPL CREATININE-BSD FRML MDRD: 77 ML/MIN/1.73SQ M
GLUCOSE P FAST SERPL-MCNC: 86 MG/DL (ref 65–99)
HCT VFR BLD AUTO: 45.2 % (ref 34.8–46.1)
HDLC SERPL-MCNC: 87 MG/DL
HGB BLD-MCNC: 14.5 G/DL (ref 11.5–15.4)
IMM GRANULOCYTES # BLD AUTO: 0.02 THOUSAND/UL (ref 0–0.2)
IMM GRANULOCYTES NFR BLD AUTO: 0 % (ref 0–2)
LDLC SERPL CALC-MCNC: 128 MG/DL (ref 0–100)
LYMPHOCYTES # BLD AUTO: 1.53 THOUSANDS/ÂΜL (ref 0.6–4.47)
LYMPHOCYTES NFR BLD AUTO: 31 % (ref 14–44)
MCH RBC QN AUTO: 30.5 PG (ref 26.8–34.3)
MCHC RBC AUTO-ENTMCNC: 32.1 G/DL (ref 31.4–37.4)
MCV RBC AUTO: 95 FL (ref 82–98)
MONOCYTES # BLD AUTO: 0.52 THOUSAND/ÂΜL (ref 0.17–1.22)
MONOCYTES NFR BLD AUTO: 11 % (ref 4–12)
NEUTROPHILS # BLD AUTO: 2.72 THOUSANDS/ÂΜL (ref 1.85–7.62)
NEUTS SEG NFR BLD AUTO: 55 % (ref 43–75)
NRBC BLD AUTO-RTO: 0 /100 WBCS
PLATELET # BLD AUTO: 345 THOUSANDS/UL (ref 149–390)
PMV BLD AUTO: 10.5 FL (ref 8.9–12.7)
POTASSIUM SERPL-SCNC: 4.4 MMOL/L (ref 3.5–5.3)
PROT SERPL-MCNC: 8 G/DL (ref 6.4–8.4)
RBC # BLD AUTO: 4.75 MILLION/UL (ref 3.81–5.12)
SODIUM SERPL-SCNC: 137 MMOL/L (ref 135–147)
TRIGL SERPL-MCNC: 43 MG/DL
TSH SERPL DL<=0.05 MIU/L-ACNC: 1.57 UIU/ML (ref 0.45–4.5)
WBC # BLD AUTO: 4.93 THOUSAND/UL (ref 4.31–10.16)

## 2023-07-17 PROCEDURE — 84443 ASSAY THYROID STIM HORMONE: CPT

## 2023-07-17 PROCEDURE — 80061 LIPID PANEL: CPT

## 2023-07-17 PROCEDURE — 36415 COLL VENOUS BLD VENIPUNCTURE: CPT

## 2023-07-17 PROCEDURE — 99396 PREV VISIT EST AGE 40-64: CPT | Performed by: FAMILY MEDICINE

## 2023-07-17 PROCEDURE — 99213 OFFICE O/P EST LOW 20 MIN: CPT | Performed by: FAMILY MEDICINE

## 2023-07-17 PROCEDURE — 85025 COMPLETE CBC W/AUTO DIFF WBC: CPT

## 2023-07-17 PROCEDURE — 80053 COMPREHEN METABOLIC PANEL: CPT

## 2023-07-17 NOTE — ASSESSMENT & PLAN NOTE
She follows up with general surgery. Biopsy was negative for malignancy. He is getting an ultrasound every 2 years.

## 2023-07-17 NOTE — ASSESSMENT & PLAN NOTE
She is following up with rheumatology. She is taking Plaquenil. Also along the way, she was prescribed fluoxetine. I took over prescribing that. She is satisfied with the response of those medications.

## 2023-07-17 NOTE — PROGRESS NOTES
ADULT ANNUAL [facility]    NAME: Liz Juarez  AGE: 65 y.o. SEX: female  : 1960     DATE: 2023     Assessment and Plan:     Problem List Items Addressed This Visit        Endocrine    Thyroid nodule     She follows up with general surgery. Biopsy was negative for malignancy. He is getting an ultrasound every 2 years. Cardiovascular and Mediastinum    Raynaud's syndrome without gangrene     She follows up with rheumatology. He is taking nifedipine and its working well. Other    Limited scleroderma ([address])     She is following up with rheumatology. She is taking Plaquenil. Also along the way, she was prescribed fluoxetine. I took over prescribing that. She is satisfied with the response of those medications. Other Visit Diagnoses     Preventative health care    -  Primary    Screening for lipid disorders        Relevant Orders    Lipid Panel with Direct LDL reflex    Screening for diabetes mellitus        Relevant Orders    Comprehensive metabolic panel    Screening for iron deficiency anemia        Relevant Orders    CBC and differential    Screening for thyroid disorder        Relevant Orders    TSH, 3rd generation with Free T4 reflex          Immunizations and preventive care screenings were discussed with patient today. Appropriate education was printed on patient's after visit summary. Counseling:  Exercise: the importance of regular exercise/physical activity was discussed. Recommend exercise 3-5 times per week for at least 30 minutes. No follow-ups on file. Chief Complaint:     Chief Complaint   Patient presents with   • Annual Exam      History of Present Illness:     Adult Annual Physical   Patient here for a comprehensive physical exam. The patient reports no problems. This is a pleasant 29-year-old woman. She lives with her . Have 2 adult children.   She is a  in an 's office. She likes to walk and ride her bike    She is up-to-date with colorectal cancer screening and cancer screening. Diet and Physical Activity  Diet/Nutrition: well balanced diet. Exercise: moderate cardiovascular exercise and 5-7 times a week on average. Depression Screening  PHQ-2/9 Depression Screening    Little interest or pleasure in doing things: 0 - not at all  Feeling down, depressed, or hopeless: 0 - not at all  PHQ-2 Score: 0  PHQ-2 Interpretation: Negative depression screen       General Health  Sleep: sleeps well. Hearing: normal - bilateral.  Vision: no vision problems. Dental: regular dental visits. She follows up with gynecology. Review of Systems:     Review of Systems   Constitutional: Negative for chills and fever. HENT: Negative for ear pain, postnasal drip, rhinorrhea, sore throat and trouble swallowing. Eyes: Negative for pain, redness and visual disturbance. Respiratory: Negative for cough and shortness of breath. Cardiovascular: Negative for chest pain and palpitations. Gastrointestinal: Negative for abdominal pain, constipation, diarrhea and vomiting. Genitourinary: Negative for dysuria, frequency, hematuria and urgency. Musculoskeletal: Negative for arthralgias, back pain, joint swelling and myalgias. Skin: Negative for color change and rash. Neurological: Negative for seizures and syncope. Hematological: Negative for adenopathy. Psychiatric/Behavioral: Negative for decreased concentration, dysphoric mood and sleep disturbance. The patient is not nervous/anxious. All other systems reviewed and are negative.      Past Medical History:     Past Medical History:   Diagnosis Date   • Arthritis    • Carpal tunnel syndrome 11/12/2013   • Disease of thyroid gland     Nodule   • MARLEY (dyspnea on exertion)    • No known problems    • Raynaud's disease       Past Surgical History:     Past Surgical History: Procedure Laterality Date   • TOOTH EXTRACTION     • US GUIDED THYROID BIOPSY  9/23/2021      Social History:     Social History     Socioeconomic History   • Marital status: /Civil Union     Spouse name: None   • Number of children: None   • Years of education: None   • Highest education level: None   Occupational History   • Occupation: Full-Time Employment   Tobacco Use   • Smoking status: Never   • Smokeless tobacco: Never   Substance and Sexual Activity   • Alcohol use:  Yes     Alcohol/week: 7.0 standard drinks of alcohol     Types: 3 Glasses of wine, 3 Cans of beer, 1 Standard drinks or equivalent per week     Comment: social   • Drug use: Never   • Sexual activity: Yes     Partners: Male     Birth control/protection: Post-menopausal, Male Sterilization, Other   Other Topics Concern   • None   Social History Narrative    Always uses seat belt     Social Determinants of Health     Financial Resource Strain: Not on file   Food Insecurity: Not on file   Transportation Needs: Not on file   Physical Activity: Not on file   Stress: Not on file   Social Connections: Not on file   Intimate Partner Violence: Not on file   Housing Stability: Not on file      Family History:     Family History   Problem Relation Age of Onset   • Skin cancer Mother    • Breast cancer Mother    • Cancer Mother         Breast & Melanoma   • Vision loss Mother    • Kidney cancer Father    • Lung cancer Father    • Cancer Father         Lung   • No Known Problems Brother    • No Known Problems Son    • No Known Problems Son    • Uterine cancer Maternal Grandmother    • Arthritis Maternal Grandmother    • Cancer Maternal Grandmother         Uterine   • Cancer Maternal Aunt         Breast   • Cancer Paternal Aunt         Kidney   • Cancer Paternal Aunt         Gynological   • Breast cancer Other       Current Medications:     Current Outpatient Medications   Medication Sig Dispense Refill   • clindamycin (CLEOCIN T) 1 % lotion APPLY TO RASH ONCE DAILY. • FLUoxetine (PROzac) 20 mg capsule Take 1 capsule (20 mg total) by mouth daily 90 capsule 0   • hydroxychloroquine (Plaquenil) 200 mg tablet Take 1 tablet (200 mg total) by mouth daily 90 tablet 1   • NIFEdipine (PROCARDIA XL) 30 mg 24 hr tablet Take 2 tablets (60 mg total) by mouth daily 180 tablet 0     No current facility-administered medications for this visit. Allergies: Allergies   Allergen Reactions   • Codeine GI Intolerance   • Tetracyclines & Related       Physical Exam:     /76 (Cuff Size: Standard)   Pulse 65   Temp 98.7 °F (37.1 °C)   Resp 16   Ht 5' 1.5" (1.562 m)   Wt 50.3 kg (110 lb 12.8 oz)   BMI 20.60 kg/m²     Physical Exam  Vitals and nursing note reviewed. Constitutional:       Appearance: Normal appearance. She is well-developed. HENT:      Head: Normocephalic. Right Ear: Tympanic membrane and ear canal normal.      Left Ear: Tympanic membrane and ear canal normal.      Nose: Nose normal. No mucosal edema, congestion or rhinorrhea. Mouth/Throat:      Mouth: Mucous membranes are moist.      Pharynx: Uvula midline. No oropharyngeal exudate. Tonsils: No tonsillar exudate. Eyes:      General: Lids are normal.      Conjunctiva/sclera: Conjunctivae normal.      Pupils: Pupils are equal, round, and reactive to light. Neck:      Thyroid: No thyromegaly. Vascular: No carotid bruit. Trachea: Trachea normal.   Cardiovascular:      Rate and Rhythm: Normal rate and regular rhythm. Pulses: Normal pulses. Heart sounds: Normal heart sounds, S1 normal and S2 normal. No murmur heard. Pulmonary:      Effort: Pulmonary effort is normal. No respiratory distress. Breath sounds: Normal breath sounds. No wheezing, rhonchi or rales. Abdominal:      General: Bowel sounds are normal.      Palpations: Abdomen is soft. Tenderness: There is no abdominal tenderness. Hernia: No hernia is present.    Musculoskeletal: General: No swelling or deformity. Normal range of motion. Cervical back: Normal range of motion. Lymphadenopathy:      Cervical: No cervical adenopathy. Skin:     General: Skin is warm and dry. Findings: No rash. Neurological:      General: No focal deficit present. Mental Status: She is alert and oriented to person, place, and time. Mental status is at baseline. Cranial Nerves: No cranial nerve deficit. Sensory: No sensory deficit. Coordination: Coordination normal.      Deep Tendon Reflexes: Reflexes are normal and symmetric. Psychiatric:         Mood and Affect: Mood normal.         Speech: Speech normal.         Behavior: Behavior normal. Behavior is cooperative. Thought Content:  Thought content normal.         Judgment: Judgment normal.          Kami Durham MD  [id number] [facility]

## 2023-09-16 DIAGNOSIS — I73.00 RAYNAUD'S SYNDROME WITHOUT GANGRENE: ICD-10-CM

## 2023-09-18 RX ORDER — FLUOXETINE HYDROCHLORIDE 20 MG/1
20 CAPSULE ORAL DAILY
Qty: 90 CAPSULE | Refills: 0 | Status: SHIPPED | OUTPATIENT
Start: 2023-09-18

## 2023-09-29 ENCOUNTER — TELEPHONE (OUTPATIENT)
Dept: RHEUMATOLOGY | Facility: CLINIC | Age: 63
End: 2023-09-29

## 2023-10-13 DIAGNOSIS — I73.00 RAYNAUD'S SYNDROME WITHOUT GANGRENE: ICD-10-CM

## 2023-10-13 DIAGNOSIS — M25.50 POLYARTHRALGIA: ICD-10-CM

## 2023-10-13 DIAGNOSIS — M34.9 LIMITED SCLERODERMA (HCC): ICD-10-CM

## 2023-10-16 RX ORDER — HYDROXYCHLOROQUINE SULFATE 200 MG/1
200 TABLET, FILM COATED ORAL DAILY
Qty: 180 TABLET | Refills: 0 | Status: SHIPPED | OUTPATIENT
Start: 2023-10-16 | End: 2023-10-19 | Stop reason: SDUPTHER

## 2023-10-19 ENCOUNTER — OFFICE VISIT (OUTPATIENT)
Dept: RHEUMATOLOGY | Facility: CLINIC | Age: 63
End: 2023-10-19
Payer: COMMERCIAL

## 2023-10-19 VITALS
BODY MASS INDEX: 20.96 KG/M2 | WEIGHT: 111 LBS | HEIGHT: 61 IN | DIASTOLIC BLOOD PRESSURE: 70 MMHG | SYSTOLIC BLOOD PRESSURE: 102 MMHG

## 2023-10-19 DIAGNOSIS — M25.50 POLYARTHRALGIA: ICD-10-CM

## 2023-10-19 DIAGNOSIS — M34.9 DIFFUSE SYSTEMIC SCLEROSIS (HCC): Primary | ICD-10-CM

## 2023-10-19 DIAGNOSIS — I73.00 RAYNAUD'S SYNDROME WITHOUT GANGRENE: ICD-10-CM

## 2023-10-19 DIAGNOSIS — M34.9 LIMITED SCLERODERMA (HCC): ICD-10-CM

## 2023-10-19 PROCEDURE — 99214 OFFICE O/P EST MOD 30 MIN: CPT | Performed by: STUDENT IN AN ORGANIZED HEALTH CARE EDUCATION/TRAINING PROGRAM

## 2023-10-19 RX ORDER — HYDROXYCHLOROQUINE SULFATE 200 MG/1
200 TABLET, FILM COATED ORAL DAILY
Qty: 180 TABLET | Refills: 0 | Status: SHIPPED | OUTPATIENT
Start: 2023-10-19 | End: 2024-04-16

## 2023-10-19 RX ORDER — MYCOPHENOLATE MOFETIL 500 MG/1
TABLET ORAL
Qty: 90 TABLET | Refills: 3 | Status: SHIPPED | OUTPATIENT
Start: 2023-10-19

## 2023-10-19 NOTE — PROGRESS NOTES
Rheumatology Follow-up Visit  10/19/2023     CC: routine follow up     Permanent History: Dxd with Scl70+ SSc around 2015 with Raynaud's, sclerodactyly, perioral tightness and hand arthralgias. On hydroxychloroquine when presented to [facility] in 2020 which helped with arthralgias. Has been getting regular PFTs which have been unremarkable. Was briefly on sildenafil but stopped due to insurance coverage    Not on any DMARDs other than hydroxychloroquine. Assessment: 58 yo F here for the above    Scl70 assoc with higher risk of ILD development; fortunately patient's routine PFTs have showed no issues and she has no concerning symptoms. Plan:  - BID --> 100 BID after 2 weeks; went over REMS information in detail with patient and she is amenable to trying this medication  -CBC/diff 2 weeks after starting MMF, 2 weeks after upping dose, and monthly thereafter until next visit  -Continue hydroxychloroquine  -Will ask PCP if he can take over nifedipine prescribing, as she is developing some symptoms concerning for hypotension when taking the higher dose and I am not comfortable managing that aspect of the medication myself    Yomi Sheikh, , CCD    Yomi Sheikh DO, CCD  [facility]      Interval History: Still getting skin puffiness of fingers, not improving. Tightness around the mouth, getting worse. Raynaud's still bad in hands. Hasn't taken her CCB yet this morning and fingers are dusky. Just had a precancerous lesion biopsied from the face, going to get it frozen off    Urinates a lot; has been progressive. Getting worse at nighttime. Pain in the R ankle. Worse with weight-bearing. Has neuropathy in the R toes. Denies:  Fever  CP  SOB  Rash  Stomach pain  Constipation/bloating  Hematochezia  Hematuria     at a law office    Past medical history, allergies, and family history reviewed. Active medications and social history as below.      Outpatient Medications Marked as Taking for the 10/19/23 encounter (Office Visit) with Fabián Hernandez, DO   Medication    hydroxychloroquine (Plaquenil) 200 mg tablet    mycophenolate (CELLCEPT) 500 mg tablet       Social History     Tobacco Use    Smoking status: Never    Smokeless tobacco: Never   Substance Use Topics    Alcohol use: Yes     Alcohol/week: 7.0 standard drinks of alcohol     Types: 3 Glasses of wine, 3 Cans of beer, 1 Standard drinks or equivalent per week     Comment: social    Drug use: Never       Review of Systems:  Pertinent findings documented in HPI  ___________________________________    Physical Exam:    /70   Ht 5' 1" (1.549 m)   Wt 50.3 kg (111 lb)   BMI 20.97 kg/m²     General: Well appearing, in no distress. Eyes: Sclera non-icteric. EOMI  HENT: No oral ulcers. MMM. Mildly decreased oral aperture  Heart: Regular rate and rhythm, no obvious murmurs. Lungs: Lungs clear bilaterally without obvious wheezes or crackles. Extremities: Warm, well perfused, no edema. Neuro: Alert and oriented. No gross focal neurological deficits. Skin: No rashes.  Puffiness of all fingers, duskiness of fingertips wo ulceration, periungual erythema  MSK exam: no tenderness to palpation or synovitis  ____________________________    Lab Results: relevant labs reviewed    Imaging Results: relevant images reviewed    Other Results:  Jan 2023: PFTs including DLCO, spirometry, lung volumes, bronchodilator response WNL

## 2023-10-19 NOTE — PATIENT INSTRUCTIONS
Please get blood work (CBC, CMP) two weeks after starting Cellcept, and again two weeks after increasing the dose. After that, please get it checked every month.

## 2023-11-06 ENCOUNTER — APPOINTMENT (OUTPATIENT)
Dept: LAB | Facility: HOSPITAL | Age: 63
End: 2023-11-06
Payer: COMMERCIAL

## 2023-11-06 LAB
ALBUMIN SERPL BCP-MCNC: 4.6 G/DL (ref 3.5–5)
ALP SERPL-CCNC: 60 U/L (ref 34–104)
ALT SERPL W P-5'-P-CCNC: 12 U/L (ref 7–52)
ANION GAP SERPL CALCULATED.3IONS-SCNC: 5 MMOL/L
AST SERPL W P-5'-P-CCNC: 20 U/L (ref 13–39)
BASOPHILS # BLD AUTO: 0.04 THOUSANDS/ÂΜL (ref 0–0.1)
BASOPHILS NFR BLD AUTO: 1 % (ref 0–1)
BILIRUB SERPL-MCNC: 0.67 MG/DL (ref 0.2–1)
BUN SERPL-MCNC: 15 MG/DL (ref 5–25)
CALCIUM SERPL-MCNC: 9.6 MG/DL (ref 8.4–10.2)
CHLORIDE SERPL-SCNC: 101 MMOL/L (ref 96–108)
CO2 SERPL-SCNC: 28 MMOL/L (ref 21–32)
CREAT SERPL-MCNC: 0.74 MG/DL (ref 0.6–1.3)
EOSINOPHIL # BLD AUTO: 0.09 THOUSAND/ÂΜL (ref 0–0.61)
EOSINOPHIL NFR BLD AUTO: 2 % (ref 0–6)
ERYTHROCYTE [DISTWIDTH] IN BLOOD BY AUTOMATED COUNT: 12.4 % (ref 11.6–15.1)
GFR SERPL CREATININE-BSD FRML MDRD: 87 ML/MIN/1.73SQ M
GLUCOSE P FAST SERPL-MCNC: 79 MG/DL (ref 65–99)
HCT VFR BLD AUTO: 40.7 % (ref 34.8–46.1)
HGB BLD-MCNC: 13.4 G/DL (ref 11.5–15.4)
IMM GRANULOCYTES # BLD AUTO: 0.01 THOUSAND/UL (ref 0–0.2)
IMM GRANULOCYTES NFR BLD AUTO: 0 % (ref 0–2)
LYMPHOCYTES # BLD AUTO: 1.58 THOUSANDS/ÂΜL (ref 0.6–4.47)
LYMPHOCYTES NFR BLD AUTO: 31 % (ref 14–44)
MCH RBC QN AUTO: 30.9 PG (ref 26.8–34.3)
MCHC RBC AUTO-ENTMCNC: 32.9 G/DL (ref 31.4–37.4)
MCV RBC AUTO: 94 FL (ref 82–98)
MONOCYTES # BLD AUTO: 0.49 THOUSAND/ÂΜL (ref 0.17–1.22)
MONOCYTES NFR BLD AUTO: 10 % (ref 4–12)
NEUTROPHILS # BLD AUTO: 2.94 THOUSANDS/ÂΜL (ref 1.85–7.62)
NEUTS SEG NFR BLD AUTO: 56 % (ref 43–75)
NRBC BLD AUTO-RTO: 0 /100 WBCS
PLATELET # BLD AUTO: 339 THOUSANDS/UL (ref 149–390)
PMV BLD AUTO: 10 FL (ref 8.9–12.7)
POTASSIUM SERPL-SCNC: 4.2 MMOL/L (ref 3.5–5.3)
PROT SERPL-MCNC: 7.4 G/DL (ref 6.4–8.4)
RBC # BLD AUTO: 4.33 MILLION/UL (ref 3.81–5.12)
SODIUM SERPL-SCNC: 134 MMOL/L (ref 135–147)
WBC # BLD AUTO: 5.15 THOUSAND/UL (ref 4.31–10.16)

## 2023-11-19 DIAGNOSIS — I73.00 RAYNAUD'S SYNDROME WITHOUT GANGRENE: ICD-10-CM

## 2023-11-19 DIAGNOSIS — M34.9 DIFFUSE SYSTEMIC SCLEROSIS (HCC): ICD-10-CM

## 2023-11-20 ENCOUNTER — APPOINTMENT (OUTPATIENT)
Dept: LAB | Facility: HOSPITAL | Age: 63
End: 2023-11-20
Attending: STUDENT IN AN ORGANIZED HEALTH CARE EDUCATION/TRAINING PROGRAM
Payer: COMMERCIAL

## 2023-11-20 RX ORDER — MYCOPHENOLATE MOFETIL 500 MG/1
TABLET ORAL
Qty: 90 TABLET | Refills: 0 | Status: SHIPPED | OUTPATIENT
Start: 2023-11-20

## 2023-11-20 RX ORDER — NIFEDIPINE 30 MG/1
60 TABLET, EXTENDED RELEASE ORAL DAILY
Qty: 180 TABLET | Refills: 0 | Status: SHIPPED | OUTPATIENT
Start: 2023-11-20

## 2023-12-15 ENCOUNTER — PATIENT MESSAGE (OUTPATIENT)
Dept: RHEUMATOLOGY | Facility: CLINIC | Age: 63
End: 2023-12-15

## 2023-12-15 DIAGNOSIS — M34.9 DIFFUSE SYSTEMIC SCLEROSIS (HCC): ICD-10-CM

## 2023-12-15 DIAGNOSIS — Z79.60 LONG-TERM USE OF IMMUNOSUPPRESSANT MEDICATION: Primary | ICD-10-CM

## 2023-12-15 RX ORDER — MYCOPHENOLATE MOFETIL 500 MG/1
TABLET ORAL
Qty: 90 TABLET | Refills: 0 | Status: SHIPPED | OUTPATIENT
Start: 2023-12-15

## 2023-12-24 DIAGNOSIS — I73.00 RAYNAUD'S SYNDROME WITHOUT GANGRENE: ICD-10-CM

## 2023-12-26 RX ORDER — FLUOXETINE HYDROCHLORIDE 20 MG/1
20 CAPSULE ORAL DAILY
Qty: 90 CAPSULE | Refills: 1 | Status: SHIPPED | OUTPATIENT
Start: 2023-12-26

## 2024-01-05 ENCOUNTER — HOSPITAL ENCOUNTER (OUTPATIENT)
Dept: PULMONOLOGY | Facility: HOSPITAL | Age: 64
End: 2024-01-05
Payer: COMMERCIAL

## 2024-01-05 DIAGNOSIS — M34.9 DIFFUSE SYSTEMIC SCLEROSIS (HCC): ICD-10-CM

## 2024-01-05 PROCEDURE — 94760 N-INVAS EAR/PLS OXIMETRY 1: CPT

## 2024-01-05 PROCEDURE — 94729 DIFFUSING CAPACITY: CPT

## 2024-01-05 PROCEDURE — 94010 BREATHING CAPACITY TEST: CPT

## 2024-01-05 PROCEDURE — 94726 PLETHYSMOGRAPHY LUNG VOLUMES: CPT | Performed by: INTERNAL MEDICINE

## 2024-01-05 PROCEDURE — 94726 PLETHYSMOGRAPHY LUNG VOLUMES: CPT

## 2024-01-05 PROCEDURE — 94010 BREATHING CAPACITY TEST: CPT | Performed by: INTERNAL MEDICINE

## 2024-01-05 PROCEDURE — 94729 DIFFUSING CAPACITY: CPT | Performed by: INTERNAL MEDICINE

## 2024-01-05 RX ORDER — ALBUTEROL SULFATE 2.5 MG/3ML
2.5 SOLUTION RESPIRATORY (INHALATION) ONCE
Status: DISCONTINUED | OUTPATIENT
Start: 2024-01-05 | End: 2024-01-05

## 2024-01-10 ENCOUNTER — APPOINTMENT (OUTPATIENT)
Dept: LAB | Facility: HOSPITAL | Age: 64
End: 2024-01-10
Attending: STUDENT IN AN ORGANIZED HEALTH CARE EDUCATION/TRAINING PROGRAM
Payer: COMMERCIAL

## 2024-01-18 NOTE — PROGRESS NOTES
Rheumatology Follow-up Visit  1/19/2024     CC: routine follow up     Permanent History: Dxd with Scl70+ SSc around 2015 with Raynaud's, sclerodactyly, perioral tightness and hand arthralgias. On hydroxychloroquine when presented to [facility] in 2020 which helped with arthralgias. Has been getting regular PFTs which have been unremarkable.    Was briefly on sildenafil but stopped due to insurance coverage    Not on any DMARDs other than hydroxychloroquine.    Assessment: 63 y.o. female here for the above    Scl70 assoc with higher risk of ILD development; fortunately patient's routine PFTs have showed no issues and she has no concerning symptoms.    Fingers dusky today; she has been taking the nifedipine 30 mg daily instead of 60 for the most part so will got to 60, advised her to get a BP cuff and on hypotensive symptoms that should prompt evaluation. Do not want to risk ulceration and functional loss    Encounter Diagnosis     ICD-10-CM    1. Diffuse systemic sclerosis (HCC)  M34.9 hydroxychloroquine (Plaquenil) 200 mg tablet     mycophenolate (CELLCEPT) 500 mg tablet      2. Polyarthralgia  M25.50 hydroxychloroquine (Plaquenil) 200 mg tablet      3. Raynaud's syndrome without gangrene  I73.00 hydroxychloroquine (Plaquenil) 200 mg tablet      4. Long-term use of immunosuppressant medication  Z79.60 Comprehensive metabolic panel     CBC and differential     Comprehensive metabolic panel     CBC and differential          Plan:  -Continue MMF and high-risk med monitoring  -Continue hydroxychloroquine  -Increase nifedpine  -RTC 3 mos or sooner PRN    Yomi Sheikh DO, KIMBER Sheikh DO, KIMBER  [facility]      Interval History: Fingers dusky but not ulcerating or splitting like they used to    Mouth tightness not improved but not worse    Got new electric gloves and socks that she is going to try    No pulmonary symptoms, cardiac symptoms     Outpatient Medications Marked as Taking for the  "1/19/24 encounter (Office Visit) with Yomi Sheikh, DO   Medication    FLUoxetine (PROzac) 20 mg capsule    hydroxychloroquine (Plaquenil) 200 mg tablet    mycophenolate (CELLCEPT) 500 mg tablet    NIFEdipine (PROCARDIA XL) 30 mg 24 hr tablet       Social History     Tobacco Use    Smoking status: Never    Smokeless tobacco: Never   Substance Use Topics    Alcohol use: Yes     Alcohol/week: 7.0 standard drinks of alcohol     Types: 3 Glasses of wine, 3 Cans of beer, 1 Standard drinks or equivalent per week     Comment: social    Drug use: Never       Review of Systems:  Pertinent findings documented in HPI  ___________________________________    Physical Exam:    /64   Ht 5' 1\" (1.549 m)   Wt 50.3 kg (111 lb)   BMI 20.97 kg/m²     General: Well appearing, in no distress.   Eyes: Sclera non-icteric. EOMI  HENT: No oral ulcers. MMM. Mildly decreased oral aperture  Heart: Regular rate and rhythm, no obvious murmurs.  Lungs: Lungs clear bilaterally without obvious wheezes or crackles.   Extremities: Warm, well perfused, no edema.   Neuro: Alert and oriented. No gross focal neurological deficits.   Skin: Patch of dry skin R corner of mouth. No significant finger puffiness. Duskiness of all fingers with good capillary refill wo ulcerations; became less dusky as the visit progressed  MSK exam: mild tenderness to palpation L 1st MCP wo synovitis, no other significant tenderness to palpation   ____________________________    Lab Results: relevant labs reviewed    Imaging Results: relevant images reviewed    Other Results:  Jan 2024: PFTs including DLCO, spirometry, lung volumes not concerning  "

## 2024-01-19 ENCOUNTER — OFFICE VISIT (OUTPATIENT)
Dept: RHEUMATOLOGY | Facility: CLINIC | Age: 64
End: 2024-01-19
Payer: COMMERCIAL

## 2024-01-19 VITALS
BODY MASS INDEX: 20.96 KG/M2 | SYSTOLIC BLOOD PRESSURE: 108 MMHG | DIASTOLIC BLOOD PRESSURE: 64 MMHG | WEIGHT: 111 LBS | HEIGHT: 61 IN

## 2024-01-19 DIAGNOSIS — M34.9 DIFFUSE SYSTEMIC SCLEROSIS (HCC): Primary | ICD-10-CM

## 2024-01-19 DIAGNOSIS — Z79.60 LONG-TERM USE OF IMMUNOSUPPRESSANT MEDICATION: ICD-10-CM

## 2024-01-19 DIAGNOSIS — M25.50 POLYARTHRALGIA: ICD-10-CM

## 2024-01-19 DIAGNOSIS — I73.00 RAYNAUD'S SYNDROME WITHOUT GANGRENE: ICD-10-CM

## 2024-01-19 PROCEDURE — 99205 OFFICE O/P NEW HI 60 MIN: CPT | Performed by: STUDENT IN AN ORGANIZED HEALTH CARE EDUCATION/TRAINING PROGRAM

## 2024-01-19 RX ORDER — MYCOPHENOLATE MOFETIL 500 MG/1
1000 TABLET ORAL EVERY 12 HOURS SCHEDULED
Qty: 360 TABLET | Refills: 3 | Status: SHIPPED | OUTPATIENT
Start: 2024-01-19

## 2024-01-19 RX ORDER — HYDROXYCHLOROQUINE SULFATE 200 MG/1
200 TABLET, FILM COATED ORAL DAILY
Qty: 90 TABLET | Refills: 3 | Status: SHIPPED | OUTPATIENT
Start: 2024-01-19 | End: 2025-01-13

## 2024-01-19 NOTE — PATIENT INSTRUCTIONS
Continue getting monthly blood work 2 more times, then we can go to every 3 months, next due beginning of February    Take the nifedipine 2 pills daily. If you develop lightheadedness/dizziness, drink water and go back to 1 pill daily. If you develop severe symptoms, call your PCP or go to the ER if necessary    Please get a blood pressure cuff and get it calibrated at your PCP's office, then check your blood pressure once every morning and once every evening

## 2024-01-24 ENCOUNTER — OFFICE VISIT (OUTPATIENT)
Dept: SURGERY | Facility: CLINIC | Age: 64
End: 2024-01-24
Payer: COMMERCIAL

## 2024-01-24 VITALS — WEIGHT: 111.8 LBS | BODY MASS INDEX: 21.11 KG/M2 | TEMPERATURE: 98.1 F | HEIGHT: 61 IN

## 2024-01-24 DIAGNOSIS — E04.1 THYROID NODULE: Primary | ICD-10-CM

## 2024-01-24 PROCEDURE — 99212 OFFICE O/P EST SF 10 MIN: CPT | Performed by: SURGERY

## 2024-01-24 NOTE — PROGRESS NOTES
Office Visit - General Surgery  Lelia Manuel MRN: [id number]  Encounter: [id number]  Assessment/Plan    Problem List Items Addressed This Visit    None      Subjective    Lelia Manuel is a 63 y.o. female who presents for thyroid follow-up.  She has no nodule in the right side.  She has no change in her voice, difficulty swallowing or difficulty breathing.      Pt  has a past medical history of Arthritis, Carpal tunnel syndrome (11/12/2013), Disease of thyroid gland, MARLEY (dyspnea on exertion), No known problems, and Raynaud's disease.    Pt  has a past surgical history that includes Tooth extraction and US guided thyroid biopsy (9/23/2021).    family history includes Arthritis in her maternal grandmother; Breast cancer in her mother and other; Cancer in her father, maternal aunt, maternal grandmother, mother, paternal aunt, and paternal aunt; Kidney cancer in her father; Lung cancer in her father; No Known Problems in her brother, son, and son; Skin cancer in her mother; Uterine cancer in her maternal grandmother; Vision loss in her mother.    Lelia Manuel reports that she has never smoked. She has never used smokeless tobacco. She reports current alcohol use of about 7.0 standard drinks of alcohol per week. She reports that she does not use drugs.      Current Outpatient Medications on File Prior to Visit   Medication Sig Dispense Refill    FLUoxetine (PROzac) 20 mg capsule Take 1 capsule (20 mg total) by mouth daily 90 capsule 1    hydroxychloroquine (Plaquenil) 200 mg tablet Take 1 tablet (200 mg total) by mouth daily 90 tablet 3    mycophenolate (CELLCEPT) 500 mg tablet Take 2 tablets (1,000 mg total) by mouth every 12 (twelve) hours Take one pill twice a day for two weeks, then increase to two pills twice a day. 360 tablet 3    NIFEdipine (PROCARDIA XL) 30 mg 24 hr tablet Take 2 tablets (60 mg total) by mouth daily 180 tablet 0     No current facility-administered medications on file prior to visit.      Allergies   Allergen Reactions    Codeine GI Intolerance    Tetracyclines & Related        Review of Systems    Objective    Vitals:    01/24/24 0905   Temp: 98.1 °F (36.7 °C)       Physical Exam  Neck: There is about 2-1/2 cm nodule in the right side of her thyroid.  No other thyroid masses, no adenopathy anywhere in the neck.

## 2024-01-24 NOTE — ASSESSMENT & PLAN NOTE
This nodule seems stable.  Previously biopsied was benign.  Last ultrasound was a year ago and suggest to get another one in 1 to 2 years.  Will get an ultrasound next January and see her back for follow-up.

## 2024-02-14 ENCOUNTER — APPOINTMENT (OUTPATIENT)
Dept: LAB | Facility: HOSPITAL | Age: 64
End: 2024-02-14
Payer: COMMERCIAL

## 2024-02-14 LAB
ALBUMIN SERPL BCP-MCNC: 4.6 G/DL (ref 3.5–5)
ALP SERPL-CCNC: 51 U/L (ref 34–104)
ALT SERPL W P-5'-P-CCNC: 13 U/L (ref 7–52)
ANION GAP SERPL CALCULATED.3IONS-SCNC: 6 MMOL/L
AST SERPL W P-5'-P-CCNC: 18 U/L (ref 13–39)
BASOPHILS # BLD AUTO: 0.06 THOUSANDS/ÂΜL (ref 0–0.1)
BASOPHILS NFR BLD AUTO: 1 % (ref 0–1)
BILIRUB SERPL-MCNC: 0.41 MG/DL (ref 0.2–1)
BUN SERPL-MCNC: 13 MG/DL (ref 5–25)
CALCIUM SERPL-MCNC: 9.4 MG/DL (ref 8.4–10.2)
CHLORIDE SERPL-SCNC: 103 MMOL/L (ref 96–108)
CO2 SERPL-SCNC: 26 MMOL/L (ref 21–32)
CREAT SERPL-MCNC: 0.74 MG/DL (ref 0.6–1.3)
EOSINOPHIL # BLD AUTO: 0.1 THOUSAND/ÂΜL (ref 0–0.61)
EOSINOPHIL NFR BLD AUTO: 2 % (ref 0–6)
ERYTHROCYTE [DISTWIDTH] IN BLOOD BY AUTOMATED COUNT: 12.8 % (ref 11.6–15.1)
GFR SERPL CREATININE-BSD FRML MDRD: 87 ML/MIN/1.73SQ M
GLUCOSE P FAST SERPL-MCNC: 86 MG/DL (ref 65–99)
HCT VFR BLD AUTO: 39.5 % (ref 34.8–46.1)
HGB BLD-MCNC: 12.7 G/DL (ref 11.5–15.4)
IMM GRANULOCYTES # BLD AUTO: 0.01 THOUSAND/UL (ref 0–0.2)
IMM GRANULOCYTES NFR BLD AUTO: 0 % (ref 0–2)
LYMPHOCYTES # BLD AUTO: 1.89 THOUSANDS/ÂΜL (ref 0.6–4.47)
LYMPHOCYTES NFR BLD AUTO: 35 % (ref 14–44)
MCH RBC QN AUTO: 29.5 PG (ref 26.8–34.3)
MCHC RBC AUTO-ENTMCNC: 32.2 G/DL (ref 31.4–37.4)
MCV RBC AUTO: 92 FL (ref 82–98)
MONOCYTES # BLD AUTO: 0.51 THOUSAND/ÂΜL (ref 0.17–1.22)
MONOCYTES NFR BLD AUTO: 10 % (ref 4–12)
NEUTROPHILS # BLD AUTO: 2.77 THOUSANDS/ÂΜL (ref 1.85–7.62)
NEUTS SEG NFR BLD AUTO: 52 % (ref 43–75)
NRBC BLD AUTO-RTO: 0 /100 WBCS
PLATELET # BLD AUTO: 314 THOUSANDS/UL (ref 149–390)
PMV BLD AUTO: 9.4 FL (ref 8.9–12.7)
POTASSIUM SERPL-SCNC: 4 MMOL/L (ref 3.5–5.3)
PROT SERPL-MCNC: 7 G/DL (ref 6.4–8.4)
RBC # BLD AUTO: 4.3 MILLION/UL (ref 3.81–5.12)
SODIUM SERPL-SCNC: 135 MMOL/L (ref 135–147)
WBC # BLD AUTO: 5.34 THOUSAND/UL (ref 4.31–10.16)

## 2024-03-29 DIAGNOSIS — I73.00 RAYNAUD'S SYNDROME WITHOUT GANGRENE: ICD-10-CM

## 2024-03-31 DIAGNOSIS — I73.00 RAYNAUD'S SYNDROME WITHOUT GANGRENE: ICD-10-CM

## 2024-04-01 RX ORDER — NIFEDIPINE 30 MG/1
60 TABLET, EXTENDED RELEASE ORAL DAILY
Qty: 180 TABLET | Refills: 1 | Status: SHIPPED | OUTPATIENT
Start: 2024-04-01

## 2024-04-01 RX ORDER — FLUOXETINE HYDROCHLORIDE 20 MG/1
20 CAPSULE ORAL DAILY
Qty: 90 CAPSULE | Refills: 1 | Status: SHIPPED | OUTPATIENT
Start: 2024-04-01

## 2024-04-16 ENCOUNTER — APPOINTMENT (OUTPATIENT)
Dept: LAB | Facility: HOSPITAL | Age: 64
End: 2024-04-16
Payer: COMMERCIAL

## 2024-04-23 ENCOUNTER — OFFICE VISIT (OUTPATIENT)
Dept: RHEUMATOLOGY | Facility: CLINIC | Age: 64
End: 2024-04-23
Payer: COMMERCIAL

## 2024-04-23 VITALS
WEIGHT: 110 LBS | SYSTOLIC BLOOD PRESSURE: 106 MMHG | BODY MASS INDEX: 20.77 KG/M2 | HEIGHT: 61 IN | DIASTOLIC BLOOD PRESSURE: 62 MMHG

## 2024-04-23 DIAGNOSIS — M34.9 DIFFUSE SYSTEMIC SCLEROSIS (HCC): Primary | ICD-10-CM

## 2024-04-23 DIAGNOSIS — I73.00 RAYNAUD'S SYNDROME WITHOUT GANGRENE: ICD-10-CM

## 2024-04-23 DIAGNOSIS — M25.50 POLYARTHRALGIA: ICD-10-CM

## 2024-04-23 PROCEDURE — 99215 OFFICE O/P EST HI 40 MIN: CPT | Performed by: STUDENT IN AN ORGANIZED HEALTH CARE EDUCATION/TRAINING PROGRAM

## 2024-04-23 RX ORDER — HYDROXYCHLOROQUINE SULFATE 200 MG/1
200 TABLET, FILM COATED ORAL DAILY
Qty: 90 TABLET | Refills: 3 | Status: SHIPPED | OUTPATIENT
Start: 2024-04-23 | End: 2025-04-18

## 2024-04-23 NOTE — PROGRESS NOTES
Rheumatology Follow-up Visit  4/23/2024     CC: routine follow up     Permanent History: Dxd with Scl70+ SSc around 2015 with Raynaud's, sclerodactyly, perioral tightness and hand arthralgias. On hydroxychloroquine when presented to [facility] in 2020 which helped with arthralgias. Has been getting regular PFTs which have been unremarkable.    Was briefly on sildenafil but stopped due to insurance coverage; started on nifedipine.    When I first saw her Oct 2023 not on any DMARDs other than hydroxychloroquine, started MMF due to continued skin puffiness of fingers and worsening of perioral tightness.    Assessment: 63 y.o. female here for the above    Fingers stable, no new concerns, feels her oral aperture is actually slightly improved    Face rash doesn't seem CTD-related, she is seeing her Dermatologist soon and will discuss with them    Patient's rheumatologic disease(s) threaten long-term function if not appropriately treated.    Patient's rheumatologic medication(s) require intensive monitoring for toxicity. Does not endorse any significant side effects.    Encounter Diagnosis     ICD-10-CM    1. Diffuse systemic sclerosis (HCC)  M34.9 hydroxychloroquine (Plaquenil) 200 mg tablet      2. Polyarthralgia  M25.50 hydroxychloroquine (Plaquenil) 200 mg tablet      3. Raynaud's syndrome without gangrene  I73.00 hydroxychloroquine (Plaquenil) 200 mg tablet            Plan:  -Continue MMF and high-risk med monitoring  -Continue hydroxychloroquine  -Continue nifedpine  -Continue SSRI for Raynaud's  -RTC 3 mos or sooner PRN    Yomi Sheikh DO, KIMBER Sheikh DO, KIMBER  [facility]      Interval History: mmf monitoring labs good    Fingers still dusky and puffy but not worsening, no lightheadedness/dizziness, unless she stands up suddenly, and she knows not to do that    Rash @ the L corner of her mouth a little worse    Used her electric gloves all winter which helped    No finger  "ulcerations    No swallowing difficulties, rashes, SOB    Outpatient Medications Marked as Taking for the 4/23/24 encounter (Office Visit) with Yomi Paezjessica, DO   Medication    FLUoxetine (PROzac) 20 mg capsule    hydroxychloroquine (Plaquenil) 200 mg tablet    mycophenolate (CELLCEPT) 500 mg tablet    NIFEdipine (PROCARDIA XL) 30 mg 24 hr tablet    [DISCONTINUED] hydroxychloroquine (Plaquenil) 200 mg tablet       Social History     Tobacco Use    Smoking status: Never    Smokeless tobacco: Never   Substance Use Topics    Alcohol use: Yes     Alcohol/week: 7.0 standard drinks of alcohol     Types: 3 Glasses of wine, 3 Cans of beer, 1 Standard drinks or equivalent per week     Comment: social    Drug use: Never       Review of Systems:  Pertinent findings documented in HPI  ___________________________________    Physical Exam:    /62   Ht 5' 1\" (1.549 m)   Wt 49.9 kg (110 lb)   BMI 20.78 kg/m²     General: Well appearing, in no distress.   Eyes: Sclera non-icteric. EOMI  HENT: No oral ulcers. MMM. Mildly decreased oral aperture  Extremities: Warm, well perfused, no edema.   Neuro: Alert and oriented. No gross focal neurological deficits.   Skin: Patch of dry skin R corner of mouth. Duskiness of all fingers with good capillary refill wo ulcerations. Puffiness of fingers  MSK exam: no tenderness to palpation or synovitis bilateral hands  ____________________________    Lab Results: relevant labs reviewed    Imaging Results: relevant images reviewed  "

## 2024-05-28 ENCOUNTER — PATIENT MESSAGE (OUTPATIENT)
Dept: RHEUMATOLOGY | Facility: CLINIC | Age: 64
End: 2024-05-28

## 2024-05-28 DIAGNOSIS — M25.50 POLYARTHRALGIA: ICD-10-CM

## 2024-05-28 DIAGNOSIS — M34.9 DIFFUSE SYSTEMIC SCLEROSIS (HCC): ICD-10-CM

## 2024-05-28 DIAGNOSIS — I73.00 RAYNAUD'S SYNDROME WITHOUT GANGRENE: ICD-10-CM

## 2024-05-29 RX ORDER — HYDROXYCHLOROQUINE SULFATE 200 MG/1
200 TABLET, FILM COATED ORAL DAILY
Qty: 90 TABLET | Refills: 3 | Status: SHIPPED | OUTPATIENT
Start: 2024-05-29 | End: 2025-05-24

## 2024-06-24 NOTE — ASSESSMENT & PLAN NOTE
[facility] ED Admit Snapshot                                           Diagnosis:  Altered mental status, unspecified altered mental status type  (primary encounter diagnosis)  Hyperammonemia  (CMD)  Hepatic encephalopathy  (CMD)      CMS work-up in progress: N/A    Orientation: oriented to place, and person    Safety:  confused  fall risk    Mobility:  Up with Assist    Last set of vitals:  Patient Vitals for the past 24 hrs:   BP Temp Temp src Pulse Resp SpO2 Weight   06/23/24 2200 -- -- -- (!) 53 19 99 % --   06/23/24 2130 (!) 176/77 -- -- (!) 49 -- 96 % --   06/23/24 2105 (!) 160/73 -- -- (!) 50 -- 96 % --   06/23/24 2100 (!) 194/75 -- -- (!) 50 15 93 % --   06/23/24 2000 (!) 155/81 -- -- (!) 48 20 97 % --   06/23/24 1941 (!) 171/60 97.7 °F (36.5 °C) Oral (!) 47 18 96 % 72.1 kg (158 lb 15.2 oz)                Pain: Mild pain    Infusions: None    Special Considerations:  EFREN - Mario Alberto Veras updated 6/23/24 @ 5733      Sasha Kelly RN    Extension:  [id number]                   There has been some decrease in bone density, but is still osteopenia not osteoporosis      Will follow up with rheumatologist

## 2024-07-07 DIAGNOSIS — I73.00 RAYNAUD'S SYNDROME WITHOUT GANGRENE: ICD-10-CM

## 2024-07-08 RX ORDER — FLUOXETINE HYDROCHLORIDE 20 MG/1
20 CAPSULE ORAL DAILY
Qty: 90 CAPSULE | Refills: 0 | Status: SHIPPED | OUTPATIENT
Start: 2024-07-08

## 2024-07-08 RX ORDER — NIFEDIPINE 30 MG/1
60 TABLET, EXTENDED RELEASE ORAL DAILY
Qty: 180 TABLET | Refills: 1 | Status: SHIPPED | OUTPATIENT
Start: 2024-07-08

## 2024-07-16 ENCOUNTER — APPOINTMENT (OUTPATIENT)
Dept: LAB | Facility: HOSPITAL | Age: 64
End: 2024-07-16
Payer: COMMERCIAL

## 2024-07-22 ENCOUNTER — OFFICE VISIT (OUTPATIENT)
Dept: FAMILY MEDICINE CLINIC | Facility: MEDICAL CENTER | Age: 64
End: 2024-07-22
Payer: COMMERCIAL

## 2024-07-22 VITALS
RESPIRATION RATE: 18 BRPM | SYSTOLIC BLOOD PRESSURE: 100 MMHG | BODY MASS INDEX: 19.84 KG/M2 | OXYGEN SATURATION: 98 % | WEIGHT: 107.8 LBS | HEIGHT: 62 IN | DIASTOLIC BLOOD PRESSURE: 70 MMHG | TEMPERATURE: 97.3 F | HEART RATE: 67 BPM

## 2024-07-22 DIAGNOSIS — M85.80 OSTEOPENIA, UNSPECIFIED LOCATION: ICD-10-CM

## 2024-07-22 DIAGNOSIS — Z13.6 SCREENING FOR CARDIOVASCULAR CONDITION: ICD-10-CM

## 2024-07-22 DIAGNOSIS — Z00.00 ENCOUNTER FOR WELLNESS EXAMINATION IN ADULT: Primary | ICD-10-CM

## 2024-07-22 DIAGNOSIS — I73.00 RAYNAUD'S SYNDROME WITHOUT GANGRENE: ICD-10-CM

## 2024-07-22 DIAGNOSIS — Z12.11 SCREEN FOR COLON CANCER: ICD-10-CM

## 2024-07-22 DIAGNOSIS — M34.9 DIFFUSE SYSTEMIC SCLEROSIS (HCC): ICD-10-CM

## 2024-07-22 DIAGNOSIS — E04.1 THYROID NODULE: ICD-10-CM

## 2024-07-22 PROCEDURE — 99396 PREV VISIT EST AGE 40-64: CPT | Performed by: INTERNAL MEDICINE

## 2024-07-22 NOTE — PROGRESS NOTES
HS ANNUAL PHYSICAL  [facility] [place]    NAME: Lelia Manuel  AGE: 63 y.o. SEX: female  : 1960     DATE: 2024    Assessment and Plan     Problem List Items Addressed This Visit       Thyroid nodule    Relevant Orders    TSH, 3rd generation    Raynaud's syndrome without gangrene    Osteopenia    Diffuse systemic sclerosis (HCC)     Other Visit Diagnoses       Encounter for wellness examination in adult    -  Primary    Screen for colon cancer        Relevant Orders    Ambulatory Referral to Gastroenterology    Screening for cardiovascular condition        Relevant Orders    Lipid panel              Patient Counseling:   --Nutrition: Stressed importance of moderation in sodium/caffeine intake, saturated fat and cholesterol, caloric balance, sufficient intake of fresh fruits, vegetables, fiber, calcium, iron, and 1 mg of folate supplement per day (for females capable of pregnancy).  --Discussed the issue of estrogen replacement, calcium supplement, and the daily use of baby aspirin.  --Exercise: Stressed the importance of regular exercise.   --Substance Abuse: Discussed cessation/primary prevention of tobacco, alcohol, or other drug use; driving or other dangerous activities under the influence; availability of treatment for abuse.   --Sexuality: Discussed sexually transmitted diseases, partner selection, use of condoms, avoidance of unintended pregnancy  and contraceptive alternatives.   --Injury prevention: Discussed safety belts, safety helmets, smoke detector, smoking near bedding or upholstery.   --Dental health: Discussed importance of regular tooth brushing, flossing, and dental visits.  --Immunizations reviewed.  --After hours service discussed with patient  Discussed benefits of screening .  BMI Counseling: Body mass index is 19.72 kg/m². Discussed the patient's BMI with her. The BMI is normal      Return in about 1 year (around  7/22/2025).        Chief Complaint     Chief Complaint   Patient presents with    Physical Exam     annual       History of Present Illness     HPI    Well Adult Physical   Patient here for a comprehensive physical exam.      Diet and Physical Activity  Diet: well balanced diet  Weight concerns: None, patient's BMI is between 18.5-24.9  Exercise: daily      Depression Screen  PHQ-2/9 Depression Screening    Little interest or pleasure in doing things: 0 - not at all  Feeling down, depressed, or hopeless: 0 - not at all  PHQ-2 Score: 0  PHQ-2 Interpretation: Negative depression screen          General Health  Hearing: Normal:  bilateral  Vision: no vision problems and wears glasses  Dental: regular dental visits    Reproductive Health  Good        The following portions of the patient's history were reviewed and updated as appropriate: allergies, current medications, past family history, past medical history, past social history, past surgical history and problem list.    Review of Systems     Review of Systems   Constitutional:  Negative for chills, diaphoresis, fatigue and fever.   HENT:  Negative for congestion, ear discharge, ear pain, hearing loss, postnasal drip, rhinorrhea, sinus pressure, sinus pain, sneezing, sore throat and voice change.    Eyes:  Negative for pain, discharge, redness and visual disturbance.   Respiratory:  Negative for cough, chest tightness, shortness of breath and wheezing.    Cardiovascular:  Negative for chest pain, palpitations and leg swelling.   Gastrointestinal:  Negative for abdominal distention, abdominal pain, blood in stool, constipation, diarrhea, nausea and vomiting.   Endocrine: Negative for cold intolerance, heat intolerance, polydipsia, polyphagia and polyuria.   Genitourinary:  Negative for dysuria, flank pain, frequency, hematuria and urgency.   Musculoskeletal:  Positive for arthralgias. Negative for back pain, gait problem, joint swelling, myalgias, neck pain and neck  stiffness.   Skin:  Negative for rash.   Neurological:  Negative for dizziness, tremors, syncope, facial asymmetry, speech difficulty, weakness, light-headedness, numbness and headaches.   Hematological:  Does not bruise/bleed easily.   Psychiatric/Behavioral:  Negative for behavioral problems, confusion and sleep disturbance. The patient is not nervous/anxious.        Past Medical History     Past Medical History:   Diagnosis Date    Arthritis     Carpal tunnel syndrome 11/12/2013    Disease of thyroid gland     Nodule    MARLEY (dyspnea on exertion)     No known problems     Raynaud's disease        Past Surgical History     Past Surgical History:   Procedure Laterality Date    TOOTH EXTRACTION      US GUIDED THYROID BIOPSY  9/23/2021       Social History     Social History     Socioeconomic History    Marital status: /Civil Union     Spouse name: None    Number of children: None    Years of education: None    Highest education level: None   Occupational History    Occupation: Full-Time Employment   Tobacco Use    Smoking status: Never    Smokeless tobacco: Never   Vaping Use    Vaping status: Never Used   Substance and Sexual Activity    Alcohol use: Yes     Alcohol/week: 7.0 standard drinks of alcohol     Types: 3 Glasses of wine, 3 Cans of beer, 1 Standard drinks or equivalent per week     Comment: social    Drug use: Never    Sexual activity: Yes     Partners: Male     Birth control/protection: Post-menopausal, Male Sterilization, Other   Other Topics Concern    None   Social History Narrative    Always uses seat belt     Social Determinants of Health     Financial Resource Strain: Not on file   Food Insecurity: Not on file   Transportation Needs: Not on file   Physical Activity: Not on file   Stress: Not on file   Social Connections: Not on file   Intimate Partner Violence: Not on file   Housing Stability: Not on file       Family History     Family History   Problem Relation Age of Onset    Skin cancer  "Mother     Breast cancer Mother     Cancer Mother         Breast & Melanoma    Vision loss Mother     Kidney cancer Father     Lung cancer Father     Cancer Father         Lung    No Known Problems Brother     No Known Problems Son     No Known Problems Son     Uterine cancer Maternal Grandmother     Arthritis Maternal Grandmother     Cancer Maternal Grandmother         Uterine    Cancer Maternal Aunt         Breast    Cancer Paternal Aunt         Kidney    Cancer Paternal Aunt         Gynological    Breast cancer Other        Current Medications       Current Outpatient Medications:     FLUoxetine (PROzac) 20 mg capsule, Take 1 capsule (20 mg total) by mouth daily, Disp: 90 capsule, Rfl: 0    hydroxychloroquine (Plaquenil) 200 mg tablet, Take 1 tablet (200 mg total) by mouth daily, Disp: 90 tablet, Rfl: 3    mycophenolate (CELLCEPT) 500 mg tablet, Take 2 tablets (1,000 mg total) by mouth every 12 (twelve) hours Take one pill twice a day for two weeks, then increase to two pills twice a day., Disp: 360 tablet, Rfl: 3    NIFEdipine (PROCARDIA XL) 30 mg 24 hr tablet, Take 2 tablets (60 mg total) by mouth daily, Disp: 180 tablet, Rfl: 1     Allergies     Allergies   Allergen Reactions    Codeine GI Intolerance    Tetracyclines & Related        Objective     /70 (BP Location: Left arm, Patient Position: Sitting, Cuff Size: Standard)   Pulse 67   Temp (!) 97.3 °F (36.3 °C) (Temporal)   Resp 18   Ht 5' 2\" (1.575 m)   Wt 48.9 kg (107 lb 12.8 oz)   SpO2 98%   BMI 19.72 kg/m²      Physical Exam  Constitutional:       General: She is not in acute distress.     Appearance: She is well-developed. She is not diaphoretic.   HENT:      Head: Normocephalic and atraumatic.      Right Ear: External ear normal.      Left Ear: External ear normal.      Nose: Nose normal.   Eyes:      General: No scleral icterus.        Right eye: No discharge.         Left eye: No discharge.      Conjunctiva/sclera: Conjunctivae normal. "   Neck:      Thyroid: No thyromegaly.      Vascular: No JVD.      Trachea: No tracheal deviation.   Cardiovascular:      Rate and Rhythm: Normal rate and regular rhythm.      Heart sounds: Normal heart sounds. No murmur heard.     No friction rub. No gallop.   Pulmonary:      Effort: Pulmonary effort is normal. No respiratory distress.      Breath sounds: Normal breath sounds. No wheezing or rales.   Chest:      Chest wall: No tenderness.   Abdominal:      General: Bowel sounds are normal. There is no distension.      Palpations: Abdomen is soft.      Tenderness: There is no abdominal tenderness. There is no guarding or rebound.   Musculoskeletal:         General: No tenderness. Normal range of motion.      Cervical back: Normal range of motion and neck supple.   Lymphadenopathy:      Cervical: No cervical adenopathy.   Skin:     General: Skin is warm and dry.      Findings: No erythema or rash.   Neurological:      Mental Status: She is alert and oriented to person, place, and time.      Cranial Nerves: No cranial nerve deficit.      Motor: No abnormal muscle tone.      Coordination: Coordination normal.   Psychiatric:         Judgment: Judgment normal.           No results found.    Health Maintenance     Health Maintenance   Topic Date Due    Pneumococcal Vaccine: Pediatrics (0 to 5 Years) and At-Risk Patients (6 to 64 Years) (1 of 2 - PCV) Never done    Zoster Vaccine (1 of 2) Never done    DTaP,Tdap,and Td Vaccines (1 - Tdap) Never done    RSV Vaccine Age 60+ Years (1 - 1-dose 60+ series) Never done    Cervical Cancer Screening  06/20/2022    COVID-19 Vaccine (4 - 2023-24 season) 09/01/2023    Influenza Vaccine (1) 09/01/2024    Breast Cancer Screening: Mammogram  09/13/2024    Colorectal Cancer Screening  12/19/2024    Depression Screening  07/22/2025    Annual Physical  07/22/2025    Hepatitis C Screening  Completed    RSV Vaccine age 0-20 Months  Aged Out    HIB Vaccine  Aged Out    IPV Vaccine  Aged Out     Hepatitis A Vaccine  Aged Out    Meningococcal ACWY Vaccine  Aged Out    HPV Vaccine  Aged Out    HIV Screening  Discontinued     Immunization History   Administered Date(s) Administered    COVID-19 MODERNA VACC 0.5 ML IM 02/08/2021, 03/08/2021, 11/04/2021    INFLUENZA 10/28/2019    Tuberculin Skin Test-PPD Intradermal 11/20/2002       Emiliano Stoddard MD  [facility]

## 2024-07-29 ENCOUNTER — APPOINTMENT (OUTPATIENT)
Dept: LAB | Facility: CLINIC | Age: 64
End: 2024-07-29
Payer: COMMERCIAL

## 2024-07-29 DIAGNOSIS — Z13.6 SCREENING FOR CARDIOVASCULAR CONDITION: ICD-10-CM

## 2024-07-29 DIAGNOSIS — E04.1 THYROID NODULE: ICD-10-CM

## 2024-07-29 LAB
CHOLEST SERPL-MCNC: 191 MG/DL
HDLC SERPL-MCNC: 86 MG/DL
LDLC SERPL CALC-MCNC: 95 MG/DL (ref 0–100)
NONHDLC SERPL-MCNC: 105 MG/DL
TRIGL SERPL-MCNC: 50 MG/DL
TSH SERPL DL<=0.05 MIU/L-ACNC: 2.82 UIU/ML (ref 0.45–4.5)

## 2024-07-29 PROCEDURE — 36415 COLL VENOUS BLD VENIPUNCTURE: CPT

## 2024-07-29 PROCEDURE — 80061 LIPID PANEL: CPT

## 2024-07-29 PROCEDURE — 84443 ASSAY THYROID STIM HORMONE: CPT

## 2024-07-30 ENCOUNTER — OFFICE VISIT (OUTPATIENT)
Dept: RHEUMATOLOGY | Facility: CLINIC | Age: 64
End: 2024-07-30
Payer: COMMERCIAL

## 2024-07-30 VITALS
DIASTOLIC BLOOD PRESSURE: 68 MMHG | BODY MASS INDEX: 20.24 KG/M2 | HEIGHT: 62 IN | SYSTOLIC BLOOD PRESSURE: 118 MMHG | TEMPERATURE: 97.5 F | WEIGHT: 110 LBS

## 2024-07-30 DIAGNOSIS — M34.9 DIFFUSE SYSTEMIC SCLEROSIS (HCC): Primary | ICD-10-CM

## 2024-07-30 DIAGNOSIS — I73.00 RAYNAUD'S SYNDROME WITHOUT GANGRENE: ICD-10-CM

## 2024-07-30 DIAGNOSIS — M26.609 TMJ DYSFUNCTION: ICD-10-CM

## 2024-07-30 PROCEDURE — 99215 OFFICE O/P EST HI 40 MIN: CPT | Performed by: STUDENT IN AN ORGANIZED HEALTH CARE EDUCATION/TRAINING PROGRAM

## 2024-07-30 NOTE — PROGRESS NOTES
Ambulatory Visit  Name: Lelia Manuel      : 1960      MRN: [id number]  Encounter Provider: Yomi Sheikh DO  Encounter Date: 2024   Encounter department: [facility] [place]    Assessment & Plan   1. Diffuse systemic sclerosis (HCC)  Assessment & Plan:  Seems stable    Never got baseline TTE or HRCT, ordered    Continue MMF for now at current dose  Orders:  -     Echo complete w/ contrast if indicated; Future; Expected date: 2024  -     CT chest high resolution; Future; Expected date: 2024  2. TMJ dysfunction  -     Ambulatory Referral to Physical Therapy; Future  3. Raynaud's syndrome without gangrene  Assessment & Plan:  No signs for ischemia    Patient's rheumatologic disease(s) threaten long-term function if not appropriately treated.    Patient's rheumatologic medication(s) require intensive monitoring for toxicity.     History of Present Illness       No lightheadedness/dizziness, but does feel some fatigue, has been having disrupted sleep    Feels the skin around her mouth is getting a little bit looser    Was getting some rashes around her mouth recently, dry and patchy and red. No photos. A few months ago, when it was colder; not sure how weather-related it was.    No trouble breathing/shortness of breath.    No fingertip ulcers/pits.    Permanent History: Dxd with Scl70+ SSc around  with Raynaud's, sclerodactyly, perioral tightness and hand arthralgias. On hydroxychloroquine and nifedipine when presented to [facility] in  which helped with arthralgias. Has been getting regular PFTs which have been unremarkable.     Was briefly on sildenafil but stopped due to insurance coverage; started on nifedipine.     When I first saw her Oct 2023 not on any DMARDs other than hydroxychloroquine, started MMF due to continued skin puffiness of fingers and worsening of perioral tightness.    Review of Systems    Objective     /68   Temp 97.5 °F  "(36.4 °C)   Ht 5' 2\" (1.575 m)   Wt 49.9 kg (110 lb)   BMI 20.12 kg/m²      General: Well appearing, in no distress.   Eyes: Sclera non-icteric. EOMI  HENT: No oral ulcers. MMM. Lateral deviation of the jaw with opening and closing. Borderline oral aperture  Extremities: Mildly dusky fingertips with good capillary refill without pits or ulceration. Mild puffiness of fingers  Neuro: Alert and oriented. No gross focal neurological deficits.   Skin: No rashes.  MSK exam: no tenderness to palpation bilateral hands    Monitoring CBC, CMP all good    Physical Exam  Administrative Statements           "

## 2024-07-30 NOTE — PATIENT INSTRUCTIONS
As long as your Cellcept blood work continues to be good and your heart ultrasound and chest CT are good, we can have you come back in 6 months. If your finger symptoms worsen or you develop pits or ulcers in the fingertips, let me know right away.    Talk to your PCP and your dentist about your TMJ.    You should be contacted to set up appointments for the heart ultrasound and chest CT.

## 2024-08-06 ENCOUNTER — VBI (OUTPATIENT)
Dept: ADMINISTRATIVE | Facility: OTHER | Age: 64
End: 2024-08-06

## 2024-08-06 NOTE — TELEPHONE ENCOUNTER
08/06/24 12:25 PM     Chart reviewed for CRC: Colonoscopy ; nothing is submitted to the patient's insurance at this time.     Jostin Mendoza MA   [facility]

## 2024-08-21 ENCOUNTER — PREP FOR PROCEDURE (OUTPATIENT)
Age: 64
End: 2024-08-21

## 2024-08-21 ENCOUNTER — OFFICE VISIT (OUTPATIENT)
Age: 64
End: 2024-08-21
Payer: COMMERCIAL

## 2024-08-21 VITALS
WEIGHT: 110 LBS | BODY MASS INDEX: 20.24 KG/M2 | HEIGHT: 62 IN | DIASTOLIC BLOOD PRESSURE: 69 MMHG | SYSTOLIC BLOOD PRESSURE: 99 MMHG

## 2024-08-21 DIAGNOSIS — Z12.11 SCREENING FOR COLON CANCER: Primary | ICD-10-CM

## 2024-08-21 DIAGNOSIS — M34.9 DIFFUSE SYSTEMIC SCLEROSIS (HCC): ICD-10-CM

## 2024-08-21 DIAGNOSIS — I73.00 RAYNAUD'S SYNDROME WITHOUT GANGRENE: ICD-10-CM

## 2024-08-21 PROCEDURE — 99203 OFFICE O/P NEW LOW 30 MIN: CPT | Performed by: COLON & RECTAL SURGERY

## 2024-08-21 NOTE — PATIENT INSTRUCTIONS
Scheduled date of colonoscopy (as of today): 10/7/24  Physician performing colonoscopy: Keena  Location of colonoscopy: [place]  Bowel prep reviewed with patient: Golytely  Instructions reviewed with patient by: Mary SHAH  Clearances:

## 2024-08-21 NOTE — PROGRESS NOTES
"Ambulatory Visit  Name: Lelia Manuel      : 1960      MRN: [id number]  Encounter Provider: Cole Brink MD  Encounter Date: 2024   Encounter department: [facility] [place]    Assessment & Plan   1. Screening for colon cancer  -     Colonoscopy; Future; Expected date: 2024  2. Raynaud's syndrome without gangrene  3. Diffuse systemic sclerosis (HCC)      History of Present Illness     Lelia Manuel is a 63 y.o. female who presents age indicated screening colonoscopy.  Last colonoscopic evaluation     Review of Systems   Constitutional:  Negative for chills and fever.   HENT:  Negative for ear pain and sore throat.    Eyes:  Negative for pain and visual disturbance.   Respiratory:  Negative for cough and shortness of breath.    Cardiovascular:  Negative for chest pain and palpitations.   Gastrointestinal:  Negative for abdominal pain and vomiting.   Genitourinary:  Negative for dysuria and hematuria.   Musculoskeletal:  Negative for arthralgias and back pain.   Skin:  Negative for color change and rash.   Neurological:  Negative for seizures and syncope.   All other systems reviewed and are negative.    Medical History Reviewed by provider this encounter:  Tobacco  Allergies  Meds  Problems  Med Hx  Surg Hx  Fam Hx       Objective     BP 99/69   Ht 5' 2\" (1.575 m)   Wt 49.9 kg (110 lb)   BMI 20.12 kg/m²     Physical Exam  Vitals and nursing note reviewed.   Constitutional:       General: She is not in acute distress.     Appearance: She is well-developed and normal weight.   HENT:      Head: Normocephalic and atraumatic.   Eyes:      Conjunctiva/sclera: Conjunctivae normal.   Cardiovascular:      Rate and Rhythm: Normal rate and regular rhythm.      Heart sounds: No murmur heard.  Pulmonary:      Effort: Pulmonary effort is normal. No respiratory distress.      Breath sounds: Normal breath sounds.   Abdominal:      Palpations: Abdomen is soft. "      Tenderness: There is no abdominal tenderness.   Musculoskeletal:         General: No swelling.      Cervical back: Neck supple.   Skin:     General: Skin is warm and dry.      Capillary Refill: Capillary refill takes less than 2 seconds.   Neurological:      Mental Status: She is alert.   Psychiatric:         Mood and Affect: Mood normal.       Administrative Statements

## 2024-08-24 DIAGNOSIS — M34.9 DIFFUSE SYSTEMIC SCLEROSIS (HCC): ICD-10-CM

## 2024-08-27 RX ORDER — MYCOPHENOLATE MOFETIL 500 MG/1
1000 TABLET ORAL EVERY 12 HOURS SCHEDULED
Qty: 360 TABLET | Refills: 0 | Status: SHIPPED | OUTPATIENT
Start: 2024-08-27

## 2024-09-03 DIAGNOSIS — M25.50 POLYARTHRALGIA: ICD-10-CM

## 2024-09-03 DIAGNOSIS — I73.00 RAYNAUD'S SYNDROME WITHOUT GANGRENE: ICD-10-CM

## 2024-09-03 DIAGNOSIS — M34.9 DIFFUSE SYSTEMIC SCLEROSIS (HCC): ICD-10-CM

## 2024-09-03 RX ORDER — HYDROXYCHLOROQUINE SULFATE 200 MG/1
200 TABLET, FILM COATED ORAL DAILY
Qty: 90 TABLET | Refills: 0 | Status: CANCELLED | OUTPATIENT
Start: 2024-09-03 | End: 2025-08-29

## 2024-09-23 ENCOUNTER — HOSPITAL ENCOUNTER (OUTPATIENT)
Dept: CT IMAGING | Facility: HOSPITAL | Age: 64
Discharge: HOME | End: 2024-09-23
Attending: STUDENT IN AN ORGANIZED HEALTH CARE EDUCATION/TRAINING PROGRAM
Payer: COMMERCIAL

## 2024-09-23 DIAGNOSIS — M34.9 DIFFUSE SYSTEMIC SCLEROSIS (HCC): ICD-10-CM

## 2024-09-23 PROCEDURE — 71250 CT THORAX DX C-: CPT

## 2024-10-07 ENCOUNTER — ANESTHESIA EVENT (OUTPATIENT)
Dept: GASTROENTEROLOGY | Facility: HOSPITAL | Age: 64
End: 2024-10-07
Payer: COMMERCIAL

## 2024-10-07 ENCOUNTER — HOSPITAL ENCOUNTER (OUTPATIENT)
Dept: GASTROENTEROLOGY | Facility: HOSPITAL | Age: 64
Setting detail: OUTPATIENT SURGERY
Discharge: HOME | End: 2024-10-07
Attending: COLON & RECTAL SURGERY
Payer: COMMERCIAL

## 2024-10-07 ENCOUNTER — ANESTHESIA (OUTPATIENT)
Dept: GASTROENTEROLOGY | Facility: HOSPITAL | Age: 64
End: 2024-10-07
Payer: COMMERCIAL

## 2024-10-07 VITALS
WEIGHT: 103.62 LBS | SYSTOLIC BLOOD PRESSURE: 119 MMHG | RESPIRATION RATE: 16 BRPM | DIASTOLIC BLOOD PRESSURE: 75 MMHG | HEART RATE: 60 BPM | TEMPERATURE: 98.3 F | OXYGEN SATURATION: 93 % | BODY MASS INDEX: 19.07 KG/M2 | HEIGHT: 62 IN

## 2024-10-07 DIAGNOSIS — Z12.11 SCREENING FOR COLON CANCER: ICD-10-CM

## 2024-10-07 PROCEDURE — G0121 COLON CA SCRN NOT HI RSK IND: HCPCS | Performed by: COLON & RECTAL SURGERY

## 2024-10-07 RX ORDER — GLYCOPYRROLATE 0.2 MG/ML
INJECTION INTRAMUSCULAR; INTRAVENOUS AS NEEDED
Status: DISCONTINUED | OUTPATIENT
Start: 2024-10-07 | End: 2024-10-07

## 2024-10-07 RX ORDER — SODIUM CHLORIDE, SODIUM LACTATE, POTASSIUM CHLORIDE, CALCIUM CHLORIDE 600; 310; 30; 20 MG/100ML; MG/100ML; MG/100ML; MG/100ML
INJECTION, SOLUTION INTRAVENOUS CONTINUOUS PRN
Status: DISCONTINUED | OUTPATIENT
Start: 2024-10-07 | End: 2024-10-07

## 2024-10-07 RX ORDER — PROPOFOL 10 MG/ML
INJECTION, EMULSION INTRAVENOUS AS NEEDED
Status: DISCONTINUED | OUTPATIENT
Start: 2024-10-07 | End: 2024-10-07

## 2024-10-07 RX ADMIN — PROPOFOL 40 MG: 10 INJECTION, EMULSION INTRAVENOUS at 08:00

## 2024-10-07 RX ADMIN — PROPOFOL 100 MG: 10 INJECTION, EMULSION INTRAVENOUS at 07:51

## 2024-10-07 RX ADMIN — PROPOFOL 30 MG: 10 INJECTION, EMULSION INTRAVENOUS at 07:56

## 2024-10-07 RX ADMIN — PROPOFOL 30 MG: 10 INJECTION, EMULSION INTRAVENOUS at 07:58

## 2024-10-07 RX ADMIN — SODIUM CHLORIDE, SODIUM LACTATE, POTASSIUM CHLORIDE, AND CALCIUM CHLORIDE: .6; .31; .03; .02 INJECTION, SOLUTION INTRAVENOUS at 07:47

## 2024-10-07 RX ADMIN — GLYCOPYRROLATE 0.2 MG: 0.2 INJECTION INTRAMUSCULAR; INTRAVENOUS at 07:49

## 2024-10-07 NOTE — INTERVAL H&P NOTE
H&P reviewed. After examining the patient I find no changes in the patients condition since the H&P had been written.    Vitals:    10/07/24 0700   BP: 126/59   Pulse: (!) 51   Resp: 18   Temp: 98.3 °F (36.8 °C)   SpO2: 100%

## 2024-10-07 NOTE — H&P
"History and Physical -  [facility]  Lelia Manuel 64 y.o. female MRN: [id number]                  HPI: Lelia Manuel is a 64 y.o. year old female who presents for screening colon neoplasia      REVIEW OF SYSTEMS: Per the HPI, and otherwise unremarkable.    Historical Information   Past Medical History:   Diagnosis Date    Arthritis     Carpal tunnel syndrome 11/12/2013    Disease of thyroid gland     Nodule    MARLEY (dyspnea on exertion)     resolved    No known problems     Raynaud's disease     Scleroderma (HCC)      Past Surgical History:   Procedure Laterality Date    TOOTH EXTRACTION      US GUIDED THYROID BIOPSY  9/23/2021     Social History   Social History     Substance and Sexual Activity   Alcohol Use Yes    Alcohol/week: 7.0 standard drinks of alcohol    Types: 3 Glasses of wine, 3 Cans of beer, 1 Standard drinks or equivalent per week    Comment: social     Social History     Substance and Sexual Activity   Drug Use Never     Social History     Tobacco Use   Smoking Status Never   Smokeless Tobacco Never     Family History   Problem Relation Age of Onset    Skin cancer Mother     Breast cancer Mother     Cancer Mother         Breast & Melanoma    Vision loss Mother     Kidney cancer Father     Lung cancer Father     Cancer Father         Lung    No Known Problems Brother     No Known Problems Son     No Known Problems Son     Uterine cancer Maternal Grandmother     Arthritis Maternal Grandmother     Cancer Maternal Grandmother         Uterine    Cancer Maternal Aunt         Breast    Cancer Paternal Aunt         Kidney    Cancer Paternal Aunt         Gynological    Breast cancer Other        Meds/Allergies     Not in a hospital admission.    Allergies   Allergen Reactions    Codeine GI Intolerance    Tetracyclines & Related        Objective     Blood pressure 126/59, pulse (!) 51, temperature 98.3 °F (36.8 °C), temperature source Temporal, resp. rate 18, height 5' 2\" (1.575 m), weight " 47 kg (103 lb 9.9 oz), SpO2 100%.      PHYSICAL EXAM    Gen: NAD  CV: RRR  CHEST: Clear  ABD: soft, NT/ND  EXT: no edema  Neuro: AAO      ASSESSMENT/PLAN:  This is a 64 y.o. year old female here for screening colon neoplasia     PLAN:   Procedure: colonoscopy

## 2024-10-07 NOTE — ANESTHESIA POSTPROCEDURE EVALUATION
Post-Op Assessment Note    CV Status:  Stable  Pain Score: 0    Pain management: adequate       Mental Status:  Sleepy   Hydration Status:  Stable   PONV Controlled:  None   Airway Patency:  Patent  Two or more mitigation strategies used for obstructive sleep apnea   Post Op Vitals Reviewed: Yes    No anethesia notable event occurred.    Staff: Anesthesiologist               /54 (10/07/24 0809)    Temp 98.3 °F (36.8 °C) (10/07/24 0809)    Pulse 57 (10/07/24 0809)   Resp 16 (10/07/24 0809)    SpO2 99 % (10/07/24 0809)

## 2024-10-07 NOTE — ANESTHESIA PREPROCEDURE EVALUATION
"Procedure:  COLONOSCOPY    No lung involvement of SCC on recent CT scan    Relevant Problems   CARDIO   (+) Raynaud's syndrome without gangrene      Rheumatology   (+) Diffuse systemic sclerosis (HCC)      Orthopedic/Musculoskeletal   (+) Osteopenia        Physical Exam    Airway    Mallampati score: II  TM Distance: >3 FB  Neck ROM: full     Dental       Cardiovascular      Pulmonary      Other Findings  post-pubertal.      Anesthesia Plan  ASA Score- 2     Anesthesia Type- IV sedation with anesthesia with ASA Monitors.         Additional Monitors:     Airway Plan:     Comment: Recent labs personally reviewed:  Lab Results       Component                Value               Date                       WBC                      5.00                07/16/2024                 HGB                      13.4                07/16/2024                 PLT                      323                 07/16/2024            Lab Results       Component                Value               Date                       NA                       139                 05/29/2015                 K                        4.4                 07/16/2024                 BUN                      18                  07/16/2024                 CREATININE               0.87                07/16/2024                 GLUCOSE                  74                  05/29/2015            No results found for: \"PTT\"   No results found for: \"INR\"    Blood type     I, Missy Romero MD, have personally seen and evaluated the patient prior to anesthetic care.  I have reviewed the pre-anesthetic record, medical history, allergies, medications and any other medical records if appropriate to the anesthetic care.  If a CRNA is involved in the case, I have reviewed the CRNA assessment, if present, and agree. Patient consented for IV Sedation, general anesthesia as back up. Discussed risks of aspiration, IV infiltration, indications for conversion to general anesthesia. " All questions and concerns addressed.     .       Plan Factors-Exercise tolerance (METS): >4 METS.    Chart reviewed.   Existing labs reviewed. Patient summary reviewed.    Patient is not a current smoker.  Patient did not smoke on day of surgery.    Obstructive sleep apnea risk education given perioperatively.        Induction- intravenous.    Postoperative Plan-         Informed Consent- Anesthetic plan and risks discussed with patient.  I personally reviewed this patient with the CRNA. Discussed and agreed on the Anesthesia Plan with the CRNA..

## 2024-10-11 DIAGNOSIS — I73.00 RAYNAUD'S SYNDROME WITHOUT GANGRENE: ICD-10-CM

## 2024-10-14 DIAGNOSIS — I73.00 RAYNAUD'S SYNDROME WITHOUT GANGRENE: ICD-10-CM

## 2024-10-14 RX ORDER — NIFEDIPINE 30 MG/1
60 TABLET, EXTENDED RELEASE ORAL DAILY
Qty: 180 TABLET | Refills: 1 | Status: SHIPPED | OUTPATIENT
Start: 2024-10-14

## 2024-10-25 ENCOUNTER — HOSPITAL ENCOUNTER (OUTPATIENT)
Dept: NON INVASIVE DIAGNOSTICS | Facility: HOSPITAL | Age: 64
Discharge: HOME | End: 2024-10-25
Attending: STUDENT IN AN ORGANIZED HEALTH CARE EDUCATION/TRAINING PROGRAM
Payer: COMMERCIAL

## 2024-10-25 VITALS
DIASTOLIC BLOOD PRESSURE: 75 MMHG | HEIGHT: 62 IN | BODY MASS INDEX: 18.95 KG/M2 | SYSTOLIC BLOOD PRESSURE: 119 MMHG | HEART RATE: 60 BPM | WEIGHT: 103 LBS

## 2024-10-25 DIAGNOSIS — M34.9 DIFFUSE SYSTEMIC SCLEROSIS (HCC): ICD-10-CM

## 2024-10-25 LAB
AORTIC ROOT: 2.6 CM
APICAL FOUR CHAMBER EJECTION FRACTION: 61 %
ASCENDING AORTA: 2.5 CM
BSA FOR ECHO PROCEDURE: 1.44 M2
DOP CALC LVOT AREA: 3.14 CM2
DOP CALC LVOT DIAMETER: 2 CM
E WAVE DECELERATION TIME: 209 MS
E/A RATIO: 1.15
FRACTIONAL SHORTENING: 39 (ref 28–44)
INTERVENTRICULAR SEPTUM IN DIASTOLE (PARASTERNAL SHORT AXIS VIEW): 0.6 CM
INTERVENTRICULAR SEPTUM: 0.6 CM (ref 0.6–1.1)
LAAS-AP2: 15.3 CM2
LAAS-AP4: 16.3 CM2
LEFT ATRIUM SIZE: 3 CM
LEFT ATRIUM VOLUME (MOD BIPLANE): 42 ML
LEFT ATRIUM VOLUME INDEX (MOD BIPLANE): 29 ML/M2
LEFT INTERNAL DIMENSION IN SYSTOLE: 2.5 CM (ref 2.1–4)
LEFT VENTRICULAR INTERNAL DIMENSION IN DIASTOLE: 4.1 CM (ref 3.5–6)
LEFT VENTRICULAR POSTERIOR WALL IN END DIASTOLE: 0.7 CM
LEFT VENTRICULAR STROKE VOLUME: 53 ML
LVSV (TEICH): 53 ML
MV E'TISSUE VEL-LAT: 14 CM/S
MV E'TISSUE VEL-SEP: 12 CM/S
MV PEAK A VEL: 0.87 M/S
MV PEAK E VEL: 100 CM/S
MV STENOSIS PRESSURE HALF TIME: 61 MS
MV VALVE AREA P 1/2 METHOD: 3.61
RIGHT ATRIUM AREA SYSTOLE A4C: 12.3 CM2
RIGHT VENTRICLE ID DIMENSION: 2.7 CM
SL CV LEFT ATRIUM LENGTH A2C: 4.7 CM
SL CV LV EF: 61
SL CV PED ECHO LEFT VENTRICLE DIASTOLIC VOLUME (MOD BIPLANE) 2D: 76 ML
SL CV PED ECHO LEFT VENTRICLE SYSTOLIC VOLUME (MOD BIPLANE) 2D: 23 ML
TR MAX PG: 24 MMHG
TR PEAK VELOCITY: 2.4 M/S
TRICUSPID ANNULAR PLANE SYSTOLIC EXCURSION: 2.2 CM
TRICUSPID VALVE PEAK REGURGITATION VELOCITY: 2.43 M/S

## 2024-10-25 PROCEDURE — 93306 TTE W/DOPPLER COMPLETE: CPT | Performed by: INTERNAL MEDICINE

## 2024-10-25 PROCEDURE — 93306 TTE W/DOPPLER COMPLETE: CPT

## 2024-12-17 DIAGNOSIS — M34.9 DIFFUSE SYSTEMIC SCLEROSIS (HCC): ICD-10-CM

## 2024-12-17 RX ORDER — MYCOPHENOLATE MOFETIL 500 MG/1
1000 TABLET ORAL EVERY 12 HOURS SCHEDULED
Qty: 360 TABLET | Refills: 3 | Status: SHIPPED | OUTPATIENT
Start: 2024-12-17

## 2025-01-09 ENCOUNTER — HOSPITAL ENCOUNTER (OUTPATIENT)
Dept: ULTRASOUND IMAGING | Facility: HOSPITAL | Age: 65
End: 2025-01-09
Attending: SURGERY
Payer: COMMERCIAL

## 2025-01-09 ENCOUNTER — RESULTS FOLLOW-UP (OUTPATIENT)
Dept: RHEUMATOLOGY | Facility: CLINIC | Age: 65
End: 2025-01-09

## 2025-01-09 ENCOUNTER — APPOINTMENT (OUTPATIENT)
Dept: LAB | Facility: HOSPITAL | Age: 65
End: 2025-01-09
Attending: STUDENT IN AN ORGANIZED HEALTH CARE EDUCATION/TRAINING PROGRAM
Payer: COMMERCIAL

## 2025-01-09 DIAGNOSIS — E04.1 THYROID NODULE: ICD-10-CM

## 2025-01-09 PROCEDURE — 76536 US EXAM OF HEAD AND NECK: CPT

## 2025-01-21 ENCOUNTER — TELEPHONE (OUTPATIENT)
Dept: SURGERY | Facility: CLINIC | Age: 65
End: 2025-01-21

## 2025-01-21 DIAGNOSIS — E04.1 THYROID NODULE: Primary | ICD-10-CM

## 2025-01-21 NOTE — TELEPHONE ENCOUNTER
Spoke to her about ultrasound findings and suggested to biopsy the nodule.  She was agreeable.  Will get this set up and see her in the office afterwards.

## 2025-01-28 ENCOUNTER — OFFICE VISIT (OUTPATIENT)
Dept: RHEUMATOLOGY | Facility: CLINIC | Age: 65
End: 2025-01-28
Payer: COMMERCIAL

## 2025-01-28 VITALS
BODY MASS INDEX: 20.06 KG/M2 | HEIGHT: 62 IN | SYSTOLIC BLOOD PRESSURE: 102 MMHG | DIASTOLIC BLOOD PRESSURE: 62 MMHG | WEIGHT: 109 LBS

## 2025-01-28 DIAGNOSIS — Z79.60 LONG-TERM USE OF IMMUNOSUPPRESSANT MEDICATION: ICD-10-CM

## 2025-01-28 DIAGNOSIS — M34.9 DIFFUSE SYSTEMIC SCLEROSIS (HCC): Primary | ICD-10-CM

## 2025-01-28 DIAGNOSIS — M25.50 POLYARTHRALGIA: ICD-10-CM

## 2025-01-28 DIAGNOSIS — I73.00 RAYNAUD'S SYNDROME WITHOUT GANGRENE: ICD-10-CM

## 2025-01-28 PROCEDURE — 99215 OFFICE O/P EST HI 40 MIN: CPT | Performed by: STUDENT IN AN ORGANIZED HEALTH CARE EDUCATION/TRAINING PROGRAM

## 2025-01-28 RX ORDER — HYDROXYCHLOROQUINE SULFATE 200 MG/1
200 TABLET, FILM COATED ORAL DAILY
Qty: 90 TABLET | Refills: 3 | Status: SHIPPED | OUTPATIENT
Start: 2025-01-28 | End: 2026-01-23

## 2025-01-28 RX ORDER — NIFEDIPINE 30 MG/1
60 TABLET, EXTENDED RELEASE ORAL DAILY
Qty: 180 TABLET | Refills: 1 | Status: SHIPPED | OUTPATIENT
Start: 2025-01-28

## 2025-01-28 RX ORDER — DESONIDE 0.5 MG/G
CREAM TOPICAL 2 TIMES DAILY
COMMUNITY
Start: 2025-01-15

## 2025-01-28 RX ORDER — MYCOPHENOLATE MOFETIL 500 MG/1
1000 TABLET ORAL EVERY 12 HOURS SCHEDULED
Qty: 360 TABLET | Refills: 3 | Status: SHIPPED | OUTPATIENT
Start: 2025-01-28

## 2025-01-28 NOTE — Clinical Note
Hi Dr. Stoddard, just wanted to let you know that patient had some questions about getting the pertussis and RSV vaccine and she plans to speak with you about it. I did provider her with guidance on if/how long to hold her Cellcept around the timing of the vaccine, which is viewable in her AVS from this visit.

## 2025-01-28 NOTE — ASSESSMENT & PLAN NOTE
Stable, no new symptoms. PFTs, HRCT, TTE all good  Orders:    hydroxychloroquine (Plaquenil) 200 mg tablet; Take 1 tablet (200 mg total) by mouth daily    mycophenolate (CELLCEPT) 500 mg tablet; Take 2 tablets (1,000 mg total) by mouth every 12 (twelve) hours Take one pill twice a day for two weeks, then increase to two pills twice a day.

## 2025-01-28 NOTE — ASSESSMENT & PLAN NOTE
Orders:    FLUoxetine (PROzac) 20 mg capsule; Take 1 capsule (20 mg total) by mouth daily    hydroxychloroquine (Plaquenil) 200 mg tablet; Take 1 tablet (200 mg total) by mouth daily    NIFEdipine (PROCARDIA XL) 30 mg 24 hr tablet; Take 2 tablets (60 mg total) by mouth daily

## 2025-01-28 NOTE — PATIENT INSTRUCTIONS
Get blood work (creatinine, calcium, LFTs, CBC) every 3 months while on Cellcept. You are next due at the beginning of April.    While on your prescribed rheumatologic medication(s) Cellcept: you must STOP the medication if you fall ill (ex: a viral infection, bacterial infection) and not restart it until your illness is resolved and/or you are finished with any antibiotics/antivirals/antifungals that are prescribed for you, whichever happens last.    While on the medication(s), if you are to get a vaccine, you may have to STOP the medication before and after your vaccination. See below for how long to stop your medication according to how often you take it.    LIVE ATTENUATED VACCINES (ex: MMR, rotavirus, smallpox, chickenpox, yellow fever)    Hold before vaccine Hold after vaccine   Steroids 4 weeks 4 weeks   Mycophenolate 4 weeks      COVID-19 VACCINE    Instructions   Abatacept (IV) Time vaccine so it is given 2 weeks before next scheduled abatacept dose, then receive abatacept as scheduled   Abatacept (SQ) Delay abatacept dose for 2 weeks after vaccine   Belimumab Delay belimumab dose for 2 weeks after vaccine   Cyclophosphamide (IV) Time cyclophosphamide administration so that it will occur 1 week after each vaccine dose   Hydroxychloroquine No changes to hydroxychloroquine timing or vaccine timing   IVIG No changes to IVIG timing or vaccine timing   Rituximab Time vaccine so that it is given 2 weeks before the next scheduled rituximab dose, then receive rituximab as scheduled   All others Delay rheumatologic medication dose for 2 weeks after vaccine, if disease activity allows     INFLUENZA VACCINE    Hold before vaccine Hold after vaccine   Methotrexate 1 week 2 weeks if able   Rituximab  n/a 2 weeks   All others CONTINUE, DO NOT HOLD CONTINUE, DO NOT HOLD     ALL OTHER VACCINES    Hold before vaccine Hold after vaccine   Methotrexate CONTINUE, DO NOT HOLD CONTINUE, DO NOT HOLD   Rituximab  n/a Time  vaccination for when next dose is due, then give rituximab at least 2 weeks after vaccine   All others CONTINUE, DO NOT HOLD CONTINUE, DO NOT HOLD     These instructions are consistent with the recommendations set forth by the American College of Rheumat[employer] (ACR).

## 2025-01-28 NOTE — PROGRESS NOTES
Name: Lelia Manuel      : 1960      MRN: [id number]  Encounter Provider: Yomi Sheikh DO  Encounter Date: 2025   Encounter department: [facility]  :  Assessment & Plan  Diffuse systemic sclerosis (HCC)  Stable, no new symptoms. PFTs, HRCT, TTE all good  Orders:    hydroxychloroquine (Plaquenil) 200 mg tablet; Take 1 tablet (200 mg total) by mouth daily    mycophenolate (CELLCEPT) 500 mg tablet; Take 2 tablets (1,000 mg total) by mouth every 12 (twelve) hours Take one pill twice a day for two weeks, then increase to two pills twice a day.    Long-term use of immunosuppressant medication  Most recent labs good  Orders:    Creatinine, serum; Standing    Calcium; Standing    Hepatic function panel; Standing    CBC and differential; Standing    Raynaud's syndrome without gangrene    Orders:    FLUoxetine (PROzac) 20 mg capsule; Take 1 capsule (20 mg total) by mouth daily    hydroxychloroquine (Plaquenil) 200 mg tablet; Take 1 tablet (200 mg total) by mouth daily    NIFEdipine (PROCARDIA XL) 30 mg 24 hr tablet; Take 2 tablets (60 mg total) by mouth daily    Polyarthralgia    Orders:    hydroxychloroquine (Plaquenil) 200 mg tablet; Take 1 tablet (200 mg total) by mouth daily      Patient's rheumatologic disease(s) threaten long-term function if not appropriately managed.    Patient's rheumatologic medication(s) require intensive monitoring for toxicity. Does not endorse any significant side effects.    History of Present Illness     Had a hangnail,  but no fingertip ulcers. Fingers still somewhat dusky in the  cold but doing well on current regimen of nifedipine and fluoxetine for her Raynaud's    No worsening of perioral tightness    Going to be a grandmother in March! Had questions about vaccinations, instructions provided in AVS    Permanent History: Dxd with Scl70+ SSc around  with Raynaud's, sclerodactyly, perioral tightness and hand arthralgias. On  "hydroxychloroquine when presented to [facility] in 2020 which helped with arthralgias. Has been getting regular PFTs which have been unremarkable.     Was briefly on sildenafil but stopped due to insurance coverage; started on nifedipine and fluoxetine.     When I first saw her Oct 2023 not on any DMARDs other than hydroxychloroquine, started MMF due to continued skin puffiness of fingers and worsening of perioral tightness. Baseline TTE and HRCT were unremarkable.     Objective   /62 (BP Location: Left arm)   Ht 5' 2\" (1.575 m)   Wt 49.4 kg (109 lb)   BMI 19.94 kg/m²      General: Well appearing, in no distress.   Eyes: Sclera non-icteric. EOMI  HENT: mildly decreased oral aperture, stable  Extremities: Fingertips cool and mildly dusky with good capillary refill and no ulcerations appreciated   Neuro: Alert and oriented. No gross focal neurological deficits.   Skin: No rashes. Skin of fingers is not taut  MSK exam: no synovitis or synovial hypertrophy, no peripheral joint tenderness to palpation   "

## 2025-02-13 ENCOUNTER — HOSPITAL ENCOUNTER (OUTPATIENT)
Dept: ULTRASOUND IMAGING | Facility: HOSPITAL | Age: 65
End: 2025-02-13
Attending: SURGERY
Payer: COMMERCIAL

## 2025-02-13 DIAGNOSIS — E04.1 THYROID NODULE: ICD-10-CM

## 2025-02-13 PROCEDURE — 88173 CYTOPATH EVAL FNA REPORT: CPT | Performed by: PATHOLOGY

## 2025-02-13 PROCEDURE — 88172 CYTP DX EVAL FNA 1ST EA SITE: CPT | Performed by: PATHOLOGY

## 2025-02-13 PROCEDURE — 10005 FNA BX W/US GDN 1ST LES: CPT

## 2025-02-13 RX ORDER — LIDOCAINE HYDROCHLORIDE 10 MG/ML
4 INJECTION, SOLUTION EPIDURAL; INFILTRATION; INTRACAUDAL; PERINEURAL ONCE
Status: DISCONTINUED | OUTPATIENT
Start: 2025-02-13 | End: 2025-02-17 | Stop reason: HOSPADM

## 2025-02-17 PROCEDURE — 88172 CYTP DX EVAL FNA 1ST EA SITE: CPT | Performed by: PATHOLOGY

## 2025-02-17 PROCEDURE — 88173 CYTOPATH EVAL FNA REPORT: CPT | Performed by: PATHOLOGY

## 2025-03-04 ENCOUNTER — OFFICE VISIT (OUTPATIENT)
Dept: SURGERY | Facility: CLINIC | Age: 65
End: 2025-03-04
Payer: COMMERCIAL

## 2025-03-04 VITALS
BODY MASS INDEX: 20.05 KG/M2 | HEART RATE: 63 BPM | DIASTOLIC BLOOD PRESSURE: 60 MMHG | TEMPERATURE: 97.3 F | WEIGHT: 109.6 LBS | SYSTOLIC BLOOD PRESSURE: 111 MMHG

## 2025-03-04 DIAGNOSIS — E04.1 THYROID NODULE: Primary | ICD-10-CM

## 2025-03-04 PROCEDURE — 99213 OFFICE O/P EST LOW 20 MIN: CPT | Performed by: SURGERY

## 2025-03-04 NOTE — PROGRESS NOTES
Name: Lelia Manuel      : 1960      MRN: [id number]  Encounter Provider: Christian Tolbert MD  Encounter Date: 3/4/2025   Encounter department: [facility] [place]  :  Assessment & Plan  Thyroid nodule  Recent ultrasound showed enlargement and a needle biopsy was done showing benign tissue.  I told her I do not think this puts her at any increased risk for anything.  Will continue to follow.  Probably ultrasound 2 years.  Return visit 1 year           History of Present Illness   HPI  Lelia Manuel is a 64 y.o. female who presents for thyroid follow-up.  She notices no new complaints or problems.  In particular, no compressive symptoms no hyper or hypothyroid symptomatology.  She recently underwent needle biopsy which was benign      Review of Systems   Constitutional:  Negative for chills and fever.   HENT:  Negative for trouble swallowing and voice change.    Eyes:  Negative for pain and visual disturbance.   Respiratory:  Negative for cough and shortness of breath.    Cardiovascular:  Negative for chest pain and leg swelling.   Gastrointestinal:  Negative for abdominal pain, nausea and vomiting.   Endocrine: Negative for cold intolerance, heat intolerance, polydipsia, polyphagia and polyuria.   Genitourinary:  Negative for difficulty urinating and flank pain.   Musculoskeletal:  Negative for arthralgias and gait problem.   Skin:  Negative for rash and wound.   Allergic/Immunologic: Negative for food allergies.   Neurological:  Negative for seizures, syncope, weakness and headaches.   Hematological:  Negative for adenopathy.   Psychiatric/Behavioral:  Negative for confusion.    All other systems reviewed and are negative.    Past Medical History   Past Medical History:   Diagnosis Date   • Arthritis    • Carpal tunnel syndrome 2013   • Disease of thyroid gland     Nodule   • MARLEY (dyspnea on exertion)     resolved   • No known problems    • Raynaud's disease    • Scleroderma (HCC)       Past Surgical History:   Procedure Laterality Date   • TOOTH EXTRACTION     • US GUIDED THYROID BIOPSY  9/23/2021   • US GUIDED THYROID BIOPSY  2/13/2025     Family History   Problem Relation Age of Onset   • Skin cancer Mother    • Breast cancer Mother    • Cancer Mother         Breast & Melanoma   • Vision loss Mother    • Kidney cancer Father    • Lung cancer Father    • Cancer Father         Lung   • No Known Problems Brother    • No Known Problems Son    • No Known Problems Son    • Uterine cancer Maternal Grandmother    • Arthritis Maternal Grandmother    • Cancer Maternal Grandmother         Uterine   • Cancer Maternal Aunt         Breast   • Cancer Paternal Aunt         Kidney   • Cancer Paternal Aunt         Gynological   • Breast cancer Other       reports that she has never smoked. She has never used smokeless tobacco. She reports current alcohol use of about 7.0 standard drinks of alcohol per week. She reports that she does not use drugs.  Current Outpatient Medications   Medication Instructions   • desonide (DESOWEN) 0.05 % cream 2 times daily   • FLUoxetine (PROZAC) 20 mg, Oral, Daily   • hydroxychloroquine (PLAQUENIL) 200 mg, Oral, Daily   • mycophenolate (CELLCEPT) 1,000 mg, Oral, Every 12 hours scheduled, Take one pill twice a day for two weeks, then increase to two pills twice a day.   • NIFEdipine (PROCARDIA XL) 60 mg, Oral, Daily     Allergies   Allergen Reactions   • Codeine GI Intolerance   • Tetracyclines & Related          Objective   /60 (BP Location: Left arm, Patient Position: Sitting, Cuff Size: Standard)   Pulse 63   Temp (!) 97.3 °F (36.3 °C) (Temporal)   Wt 49.7 kg (109 lb 9.6 oz)   BMI 20.05 kg/m²      Physical Exam  Vitals and nursing note reviewed.   Constitutional:       General: She is not in acute distress.     Appearance: She is well-developed. She is not diaphoretic.   HENT:      Head: Normocephalic and atraumatic.      Right Ear: External ear normal.      Left  Ear: External ear normal.   Eyes:      General: No scleral icterus.     Conjunctiva/sclera: Conjunctivae normal.   Neck:      Thyroid: No thyromegaly.      Trachea: No tracheal deviation.      Comments: Right thyroid about 2 to 2-1/2 cm firm nodule in the right lobe, no other thyroid abnormalities noted, no adenopathy anywhere in the neck  Cardiovascular:      Rate and Rhythm: Normal rate and regular rhythm.      Heart sounds: Normal heart sounds. No murmur heard.     No friction rub.   Pulmonary:      Effort: Pulmonary effort is normal. No respiratory distress.      Breath sounds: Normal breath sounds. No wheezing or rales.   Abdominal:      General: There is no distension.      Palpations: Abdomen is soft. There is no mass.      Tenderness: There is no abdominal tenderness. There is no guarding or rebound.   Musculoskeletal:         General: Normal range of motion.      Cervical back: Neck supple.   Lymphadenopathy:      Cervical: No cervical adenopathy.   Skin:     General: Skin is warm and dry.   Neurological:      Mental Status: She is alert and oriented to person, place, and time.   Psychiatric:         Behavior: Behavior normal.         Thought Content: Thought content normal.         Judgment: Judgment normal.

## 2025-03-04 NOTE — ASSESSMENT & PLAN NOTE
Recent ultrasound showed enlargement and a needle biopsy was done showing benign tissue.  I told her I do not think this puts her at any increased risk for anything.  Will continue to follow.  Probably ultrasound 2 years.  Return visit 1 year

## 2025-04-28 DIAGNOSIS — I73.00 RAYNAUD'S SYNDROME WITHOUT GANGRENE: ICD-10-CM

## 2025-04-29 ENCOUNTER — TELEPHONE (OUTPATIENT)
Dept: RHEUMATOLOGY | Facility: CLINIC | Age: 65
End: 2025-04-29

## 2025-04-29 DIAGNOSIS — I73.00 RAYNAUD'S SYNDROME WITHOUT GANGRENE: ICD-10-CM

## 2025-04-29 RX ORDER — NIFEDIPINE 30 MG/1
60 TABLET, EXTENDED RELEASE ORAL DAILY
Qty: 180 TABLET | Refills: 0 | Status: SHIPPED | OUTPATIENT
Start: 2025-04-29

## 2025-07-23 ENCOUNTER — OFFICE VISIT (OUTPATIENT)
Dept: FAMILY MEDICINE CLINIC | Facility: MEDICAL CENTER | Age: 65
End: 2025-07-23
Payer: COMMERCIAL

## 2025-07-23 VITALS
HEART RATE: 61 BPM | OXYGEN SATURATION: 98 % | HEIGHT: 62 IN | RESPIRATION RATE: 20 BRPM | WEIGHT: 110 LBS | TEMPERATURE: 97.4 F | DIASTOLIC BLOOD PRESSURE: 70 MMHG | SYSTOLIC BLOOD PRESSURE: 110 MMHG | BODY MASS INDEX: 20.24 KG/M2

## 2025-07-23 DIAGNOSIS — M34.9 DIFFUSE SYSTEMIC SCLEROSIS (HCC): Primary | ICD-10-CM

## 2025-07-23 DIAGNOSIS — M85.80 OSTEOPENIA, UNSPECIFIED LOCATION: ICD-10-CM

## 2025-07-23 DIAGNOSIS — I73.00 RAYNAUD'S SYNDROME WITHOUT GANGRENE: ICD-10-CM

## 2025-07-23 DIAGNOSIS — J06.9 UPPER RESPIRATORY TRACT INFECTION, UNSPECIFIED TYPE: ICD-10-CM

## 2025-07-23 DIAGNOSIS — E04.1 THYROID NODULE: ICD-10-CM

## 2025-07-23 DIAGNOSIS — Z00.00 ANNUAL PHYSICAL EXAM: ICD-10-CM

## 2025-07-23 PROBLEM — Z80.3 FAMILY HISTORY OF BREAST CANCER: Status: ACTIVE | Noted: 2024-02-02

## 2025-07-23 PROCEDURE — 99396 PREV VISIT EST AGE 40-64: CPT | Performed by: INTERNAL MEDICINE

## 2025-07-23 PROCEDURE — 99214 OFFICE O/P EST MOD 30 MIN: CPT | Performed by: INTERNAL MEDICINE

## 2025-07-23 NOTE — PATIENT INSTRUCTIONS
"Patient Education     Routine physical for adults   The Basics   Written by the doctors and editors at [facility]   What is a physical? -- A physical is a routine visit, or \"check-up,\" with your doctor. You might also hear it called a \"wellness visit\" or \"preventive visit.\"  During each visit, the doctor will:   Ask about your physical and mental health   Ask about your habits, behaviors, and lifestyle   Do an exam   Give you vaccines if needed   Talk to you about any medicines you take   Give advice about your health   Answer your questions  Getting regular check-ups is an important part of taking care of your health. It can help your doctor find and treat any problems you have. But it's also important for preventing health problems.  A routine physical is different from a \"sick visit.\" A sick visit is when you see a doctor because of a health concern or problem. Since physicals are scheduled ahead of time, you can think about what you want to ask the doctor.  How often should I get a physical? -- It depends on your age and health. In general, for people age 21 years and older:   If you are younger than 50 years, you might be able to get a physical every 3 years.   If you are 50 years or older, your doctor might recommend a physical every year.  If you have an ongoing health condition, like diabetes or high blood pressure, your doctor will probably want to see you more often.  What happens during a physical? -- In general, each visit will include:   Physical exam - The doctor or nurse will check your height, weight, heart rate, and blood pressure. They will also look at your eyes and ears. They will ask about how you are feeling and whether you have any symptoms that bother you.   Medicines - It's a good idea to bring a list of all the medicines you take to each doctor visit. Your doctor will talk to you about your medicines and answer any questions. Tell them if you are having any side effects that bother you. You " "should also tell them if you are having trouble paying for any of your medicines.   Habits and behaviors - This includes:   Your diet   Your exercise habits   Whether you smoke, drink alcohol, or use drugs   Whether you are sexually active   Whether you feel safe at home  Your doctor will talk to you about things you can do to improve your health and lower your risk of health problems. They will also offer help and support. For example, if you want to quit smoking, they can give you advice and might prescribe medicines. If you want to improve your diet or get more physical activity, they can help you with this, too.   Lab tests, if needed - The tests you get will depend on your age and situation. For example, your doctor might want to check your:   Cholesterol   Blood sugar   Iron level   Vaccines - The recommended vaccines will depend on your age, health, and what vaccines you already had. Vaccines are very important because they can prevent certain serious or deadly infections.   Discussion of screening - \"Screening\" means checking for diseases or other health problems before they cause symptoms. Your doctor can recommend screening based on your age, risk, and preferences. This might include tests to check for:   Cancer, such as breast, prostate, cervical, ovarian, colorectal, prostate, lung, or skin cancer   Sexually transmitted infections, such as chlamydia and gonorrhea   Mental health conditions like depression and anxiety  Your doctor will talk to you about the different types of screening tests. They can help you decide which screenings to have. They can also explain what the results might mean.   Answering questions - The physical is a good time to ask the doctor or nurse questions about your health. If needed, they can refer you to other doctors or specialists, too.  Adults older than 65 years often need other care, too. As you get older, your doctor will talk to you about:   How to prevent falling at " home   Hearing or vision tests   Memory testing   How to take your medicines safely   Making sure that you have the help and support you need at home  All topics are updated as new evidence becomes available and our peer review process is complete.  This topic retrieved from [employer] on: May 02, 2024.  Topic 614974 Version 1.0  Release: 32.4.3 - C32.122  © 2024 UpToDate, Inc. and/or its affiliates. All rights reserved.  Consumer Information Use and Disclaimer   Disclaimer: This generalized information is a limited summary of diagnosis, treatment, and/or medication information. It is not meant to be comprehensive and should be used as a tool to help the user understand and/or assess potential diagnostic and treatment options. It does NOT include all information about conditions, treatments, medications, side effects, or risks that may apply to a specific patient. It is not intended to be medical advice or a substitute for the medical advice, diagnosis, or treatment of a health care provider based on the health care provider's examination and assessment of a patient's specific and unique circumstances. Patients must speak with a health care provider for complete information about their health, medical questions, and treatment options, including any risks or benefits regarding use of medications. This information does not endorse any treatments or medications as safe, effective, or approved for treating a specific patient. UpToDate, Inc. and its affiliates disclaim any warranty or liability relating to this information or the use thereof.The use of this information is governed by the Terms of Use, available at https://www.woltersStrategyEyeuwer.com/en/know/clinical-effectiveness-terms. 2024© UpToDate, Inc. and its affiliates and/or licensors. All rights reserved.  Copyright   © 2024 UpToDate, Inc. and/or its affiliates. All rights reserved.

## 2025-07-23 NOTE — PROGRESS NOTES
Adult Annual Physical  Name: Lelia Manuel      : 1960      MRN: [id number]  Encounter Provider: Emiliano Stoddard MD  Encounter Date: 2025   Encounter department: [facility] [place]    :  Assessment & Plan  Diffuse systemic sclerosis (HCC)  Doing well on medication, continue hydroxychloroquine and CellCept       Raynaud's syndrome without gangrene  Continue Prozac and nifedipine.  Symptoms are well-controlled       Thyroid nodule  There was an enlarging nodule which was biopsied recently and it was benign.  Will follow-up       Osteopenia, unspecified location  Continue calcium and vitamin D       Upper respiratory tract infection, unspecified type  Her symptoms are gradually getting better.  Was told to use Flonase and Claritin as well as Tylenol as needed.       Annual physical exam             Preventive Screenings:  - Diabetes Screening: screening up-to-date  - Cholesterol Screening: screening up-to-date   - Hepatitis C screening: screening up-to-date   - HIV screening: screening up-to-date   - Cervical cancer screening: screening up-to-date   - Breast cancer screening: screening up-to-date   - Colon cancer screening: screening up-to-date   - Lung cancer screening: screening not indicated     Immunizations:  - Immunizations due: Prevnar 20, Tdap and Zoster (Shingrix)    Counseling/Anticipatory Guidance:  - Alcohol: discussed moderation in alcohol intake and recommendations for healthy alcohol use.   - Drug use: discussed harms of illicit drug use and how it can negatively impact mental/physical health.   - Tobacco use: discussed harms of tobacco use and management options for quitting.   - Dental health: discussed importance of regular tooth brushing, flossing, and dental visits.   - Sexual health: discussed sexually transmitted diseases, partner selection, use of condoms, avoidance of unintended pregnancy, and contraceptive alternatives.   - Diet: discussed recommendations for a  healthy/well-balanced diet.   - Exercise: the importance of regular exercise/physical activity was discussed. Recommend exercise 3-5 times per week for at least 30 minutes.   - Injury prevention: discussed safety/seat belts, safety helmets, smoke detectors, carbon monoxide detectors, and smoking near bedding or upholstery.       Depression Screening and Follow-up Plan: Patient was screened for depression during today's encounter. They screened negative with a PHQ-2 score of 0.          History of Present Illness   Patient is here for follow-up of chronic medical problems.  She follows up with rheumatology for the systemic sclerosis and Raynaud's syndrome and has been stable.  Thyroid nodule is stable  She was on vacation recently and had an episode of an upper respiratory infection.  It is getting better now.  She still has congestion and a cough.      Adult Annual Physical:  Patient presents for annual physical.     Diet and Physical Activity:  - Diet/Nutrition: no special diet, well balanced diet, limited junk food, consuming 3-5 servings of fruits/vegetables daily, adequate fiber intake and adequate whole grain intake.  - Exercise: walking, moderate cardiovascular exercise, strength training exercises, 3-4 times a week on average and 30-60 minutes on average.    Depression Screening:  - PHQ-2 Score: 0    General Health:  - Sleep: sleeps poorly and 7-8 hours of sleep on average.  - Hearing: normal hearing bilateral ears.  - Vision: no vision problems, most recent eye exam < 1 year ago and wears glasses.  - Dental: regular dental visits, brushes teeth twice daily and floss regularly.    /GYN Health:  - Follows with GYN: yes.   - Menopause: postmenopausal.   - History of STDs: no  - Contraception: menopause and male partner had vasectomy.      Advanced Care Planning:  - Has an advanced directive?: yes    - Has a durable medical POA?: yes      Review of Systems   Constitutional:  Positive for fatigue. Negative for  "chills, diaphoresis and fever.   HENT:  Positive for congestion and postnasal drip. Negative for ear discharge, ear pain, hearing loss, rhinorrhea, sinus pressure, sinus pain, sneezing, sore throat and voice change.    Eyes:  Negative for pain, discharge, redness and visual disturbance.   Respiratory:  Positive for cough. Negative for chest tightness, shortness of breath and wheezing.    Cardiovascular:  Negative for chest pain, palpitations and leg swelling.   Gastrointestinal:  Negative for abdominal distention, abdominal pain, blood in stool, constipation, diarrhea, nausea and vomiting.   Endocrine: Negative for cold intolerance, heat intolerance, polydipsia, polyphagia and polyuria.   Genitourinary:  Negative for dysuria, flank pain, frequency, hematuria and urgency.   Musculoskeletal:  Negative for arthralgias, back pain, gait problem, joint swelling, myalgias, neck pain and neck stiffness.   Skin:  Negative for rash.   Neurological:  Negative for dizziness, tremors, syncope, facial asymmetry, speech difficulty, weakness, light-headedness, numbness and headaches.   Hematological:  Does not bruise/bleed easily.   Psychiatric/Behavioral:  Negative for behavioral problems, confusion and sleep disturbance. The patient is not nervous/anxious.          Objective   /70 (Patient Position: Sitting, Cuff Size: Standard)   Pulse 61   Temp (!) 97.4 °F (36.3 °C) (Temporal)   Resp 20   Ht 5' 1.75\" (1.568 m)   Wt 49.9 kg (110 lb)   SpO2 98%   BMI 20.28 kg/m²     Physical Exam  Constitutional:       General: She is not in acute distress.     Appearance: She is well-developed. She is not diaphoretic.   HENT:      Head: Normocephalic and atraumatic.      Right Ear: External ear normal.      Left Ear: External ear normal.      Nose: Congestion present.      Mouth/Throat:      Pharynx: Posterior oropharyngeal erythema present.     Eyes:      General: No scleral icterus.        Right eye: No discharge.         Left " eye: No discharge.      Conjunctiva/sclera: Conjunctivae normal.       Cardiovascular:      Rate and Rhythm: Normal rate and regular rhythm.      Heart sounds: Normal heart sounds. No murmur heard.     No friction rub. No gallop.   Pulmonary:      Effort: Pulmonary effort is normal. No respiratory distress.      Breath sounds: Normal breath sounds. No wheezing or rales.   Abdominal:      General: Bowel sounds are normal. There is no distension.      Palpations: Abdomen is soft.      Tenderness: There is no abdominal tenderness. There is no guarding or rebound.     Musculoskeletal:         General: No tenderness.     Skin:     General: Skin is warm and dry.      Findings: No erythema or rash.     Neurological:      Mental Status: She is alert and oriented to person, place, and time.      Cranial Nerves: No cranial nerve deficit.      Sensory: No sensory deficit.      Motor: No abnormal muscle tone.     Psychiatric:         Behavior: Behavior normal.

## 2025-08-16 DIAGNOSIS — I73.00 RAYNAUD'S SYNDROME WITHOUT GANGRENE: ICD-10-CM

## 2025-08-18 RX ORDER — NIFEDIPINE 30 MG/1
60 TABLET, EXTENDED RELEASE ORAL DAILY
Qty: 180 TABLET | Refills: 3 | Status: SHIPPED | OUTPATIENT
Start: 2025-08-18